# Patient Record
Sex: FEMALE | Race: WHITE | NOT HISPANIC OR LATINO | URBAN - METROPOLITAN AREA
[De-identification: names, ages, dates, MRNs, and addresses within clinical notes are randomized per-mention and may not be internally consistent; named-entity substitution may affect disease eponyms.]

---

## 2018-03-20 ENCOUNTER — EMERGENCY (EMERGENCY)
Facility: HOSPITAL | Age: 78
LOS: 0 days | Discharge: HOME | End: 2018-03-20
Attending: EMERGENCY MEDICINE

## 2018-03-20 VITALS
RESPIRATION RATE: 18 BRPM | OXYGEN SATURATION: 97 % | SYSTOLIC BLOOD PRESSURE: 114 MMHG | HEART RATE: 79 BPM | DIASTOLIC BLOOD PRESSURE: 76 MMHG

## 2018-03-20 VITALS
HEART RATE: 84 BPM | DIASTOLIC BLOOD PRESSURE: 97 MMHG | SYSTOLIC BLOOD PRESSURE: 158 MMHG | OXYGEN SATURATION: 100 % | RESPIRATION RATE: 18 BRPM | TEMPERATURE: 98 F

## 2018-03-20 DIAGNOSIS — J45.909 UNSPECIFIED ASTHMA, UNCOMPLICATED: ICD-10-CM

## 2018-03-20 DIAGNOSIS — I48.91 UNSPECIFIED ATRIAL FIBRILLATION: ICD-10-CM

## 2018-03-20 DIAGNOSIS — R60.0 LOCALIZED EDEMA: ICD-10-CM

## 2018-03-20 DIAGNOSIS — R06.02 SHORTNESS OF BREATH: ICD-10-CM

## 2018-03-20 DIAGNOSIS — R00.2 PALPITATIONS: ICD-10-CM

## 2018-03-20 DIAGNOSIS — E10.9 TYPE 1 DIABETES MELLITUS WITHOUT COMPLICATIONS: ICD-10-CM

## 2018-03-20 LAB
ALBUMIN SERPL ELPH-MCNC: 4.4 G/DL — SIGNIFICANT CHANGE UP (ref 3.5–5.2)
ALP SERPL-CCNC: 89 U/L — SIGNIFICANT CHANGE UP (ref 30–115)
ALT FLD-CCNC: 21 U/L — SIGNIFICANT CHANGE UP (ref 0–41)
ANION GAP SERPL CALC-SCNC: 15 MMOL/L — HIGH (ref 7–14)
APTT BLD: 35.8 SEC — SIGNIFICANT CHANGE UP (ref 27–39.2)
AST SERPL-CCNC: 25 U/L — SIGNIFICANT CHANGE UP (ref 0–41)
BASOPHILS # BLD AUTO: 0.02 K/UL — SIGNIFICANT CHANGE UP (ref 0–0.2)
BASOPHILS NFR BLD AUTO: 0.3 % — SIGNIFICANT CHANGE UP (ref 0–1)
BILIRUB SERPL-MCNC: 0.4 MG/DL — SIGNIFICANT CHANGE UP (ref 0.2–1.2)
BUN SERPL-MCNC: 12 MG/DL — SIGNIFICANT CHANGE UP (ref 10–20)
CALCIUM SERPL-MCNC: 9.2 MG/DL — SIGNIFICANT CHANGE UP (ref 8.5–10.1)
CHLORIDE SERPL-SCNC: 97 MMOL/L — LOW (ref 98–110)
CK MB BLD-MCNC: SIGNIFICANT CHANGE UP % (ref 0–4)
CK MB CFR SERPL CALC: 3.4 NG/ML — SIGNIFICANT CHANGE UP (ref 0.6–6.3)
CK SERPL-CCNC: 140 U/L — SIGNIFICANT CHANGE UP (ref 0–225)
CO2 SERPL-SCNC: 27 MMOL/L — SIGNIFICANT CHANGE UP (ref 17–32)
CREAT SERPL-MCNC: 0.7 MG/DL — SIGNIFICANT CHANGE UP (ref 0.7–1.5)
D DIMER BLD IA.RAPID-MCNC: 340 NG/ML DDU — HIGH (ref 0–230)
EOSINOPHIL # BLD AUTO: 0.06 K/UL — SIGNIFICANT CHANGE UP (ref 0–0.7)
EOSINOPHIL NFR BLD AUTO: 0.8 % — SIGNIFICANT CHANGE UP (ref 0–8)
GLUCOSE SERPL-MCNC: 86 MG/DL — SIGNIFICANT CHANGE UP (ref 70–110)
HCT VFR BLD CALC: 36.6 % — LOW (ref 37–47)
HGB BLD-MCNC: 12.5 G/DL — SIGNIFICANT CHANGE UP (ref 12–16)
IMM GRANULOCYTES NFR BLD AUTO: 0.1 % — SIGNIFICANT CHANGE UP (ref 0.1–0.3)
INR BLD: 1.04 RATIO — SIGNIFICANT CHANGE UP (ref 0.65–1.3)
LYMPHOCYTES # BLD AUTO: 2.08 K/UL — SIGNIFICANT CHANGE UP (ref 1.2–3.4)
LYMPHOCYTES # BLD AUTO: 29.1 % — SIGNIFICANT CHANGE UP (ref 20.5–51.1)
MCHC RBC-ENTMCNC: 32.1 PG — HIGH (ref 27–31)
MCHC RBC-ENTMCNC: 34.2 G/DL — SIGNIFICANT CHANGE UP (ref 32–37)
MCV RBC AUTO: 94.1 FL — SIGNIFICANT CHANGE UP (ref 81–99)
MONOCYTES # BLD AUTO: 0.6 K/UL — SIGNIFICANT CHANGE UP (ref 0.1–0.6)
MONOCYTES NFR BLD AUTO: 8.4 % — SIGNIFICANT CHANGE UP (ref 1.7–9.3)
NEUTROPHILS # BLD AUTO: 4.37 K/UL — SIGNIFICANT CHANGE UP (ref 1.4–6.5)
NEUTROPHILS NFR BLD AUTO: 61.3 % — SIGNIFICANT CHANGE UP (ref 42.2–75.2)
NRBC # BLD: 0 /100 WBCS — SIGNIFICANT CHANGE UP (ref 0–0)
NT-PROBNP SERPL-SCNC: 1235 PG/ML — HIGH (ref 0–300)
PLATELET # BLD AUTO: 176 K/UL — SIGNIFICANT CHANGE UP (ref 130–400)
POTASSIUM SERPL-MCNC: 3.8 MMOL/L — SIGNIFICANT CHANGE UP (ref 3.5–5)
POTASSIUM SERPL-SCNC: 3.8 MMOL/L — SIGNIFICANT CHANGE UP (ref 3.5–5)
PROT SERPL-MCNC: 7.2 G/DL — SIGNIFICANT CHANGE UP (ref 6–8)
PROTHROM AB SERPL-ACNC: 11.3 SEC — SIGNIFICANT CHANGE UP (ref 9.95–12.87)
RBC # BLD: 3.89 M/UL — LOW (ref 4.2–5.4)
RBC # FLD: 13.2 % — SIGNIFICANT CHANGE UP (ref 11.5–14.5)
SODIUM SERPL-SCNC: 139 MMOL/L — SIGNIFICANT CHANGE UP (ref 135–146)
TROPONIN T SERPL-MCNC: <0.01 NG/ML — SIGNIFICANT CHANGE UP
WBC # BLD: 7.14 K/UL — SIGNIFICANT CHANGE UP (ref 4.8–10.8)
WBC # FLD AUTO: 7.14 K/UL — SIGNIFICANT CHANGE UP (ref 4.8–10.8)

## 2018-03-20 RX ORDER — SODIUM CHLORIDE 9 MG/ML
3 INJECTION INTRAMUSCULAR; INTRAVENOUS; SUBCUTANEOUS ONCE
Qty: 0 | Refills: 0 | Status: COMPLETED | OUTPATIENT
Start: 2018-03-20 | End: 2018-03-20

## 2018-03-20 RX ORDER — APIXABAN 2.5 MG/1
1 TABLET, FILM COATED ORAL
Qty: 42 | Refills: 0 | OUTPATIENT
Start: 2018-03-20 | End: 2018-04-09

## 2018-03-20 RX ORDER — APIXABAN 2.5 MG/1
2 TABLET, FILM COATED ORAL
Qty: 28 | Refills: 0
Start: 2018-03-20 | End: 2018-03-26

## 2018-03-20 RX ADMIN — SODIUM CHLORIDE 3 MILLILITER(S): 9 INJECTION INTRAMUSCULAR; INTRAVENOUS; SUBCUTANEOUS at 16:35

## 2018-03-20 NOTE — ED PROVIDER NOTE - PROGRESS NOTE DETAILS
pt is signing out ama.  pt refuses to stay for admission.  The patient wishes to leave against medical advice.  I have discussed the risks, benefits and alternatives (including the possibility of worsening of disease, pain, permanent disability, and/or death) with the patient and his/her family (if available).  The patient voices understanding of these risks, benefits, and alternatives and still wishes to sign out against medical advice.  The patient is awake, alert, oriented  x 3 and has demonstrated capacity to refuse/direct care.  I have advised the patient that they can and should return immediately should they develop any worse/different/additional symptoms, or if they change their mind and want to continue their care.   Will start pt on anticoagulation.  pt understands importance of making sure she gets the medications patient refuses admission secondary to having to take care of  at home. Patient given all diagnostic studies and will rx eliquis for a.fib. patient to follow up cardiology outpatient. pt signed out ama.  I spoke to pt at length and so did BARON Orellana. pt adamently refused admission.  pt started on eliquis.  pt aware of rx instructions and to follow up with a pmd and cardiolgoist.  pt also aware of new onset afib and about the chf.  pt understood and accepted asll risks.  pt adamently refused admission and insisted on leaving the ED.  pt instructed to follow upw ith cardiologist and informed of importance of cardiology follow up.

## 2018-03-20 NOTE — ED PROVIDER NOTE - OBJECTIVE STATEMENT
79 y/o female c/o palpitations and lower extremity edema. patient c/o increasing exertional dyspnea. patient denies any fever,chills, abdominal pain. patient denies any chest pain or syncope. patient states she drinks alcohol daily x 1 glass because she has stressor of taking care of her

## 2018-03-20 NOTE — ED PROVIDER NOTE - ATTENDING CONTRIBUTION TO CARE
77 yo f with pmh of asthma sent in for new onset afib and lower ext swelling.  Pt also with occasionl KENNEDY.  no fevers, no chills, no nausea, no vomiting, no chest pain, no back pain, no headache, no neck pain, no neck stiffness, no dizziness.  awake, alert.  neck supple.  Lungs clear.  3+ LE edema bilaterally.  abd soft, nontender.  MMM.    Pt states no blood in stool, no black stool.  a/p:  new onset afib, bilateral LE edema.  p:  labs, ekg, cxr, dvt study, reassess.  I spoke to [employer] who states DVT study is negative.

## 2018-03-20 NOTE — ED PROVIDER NOTE - NS ED ROS FT
Review of Systems    Constitutional: (-) fever/ chills (-) weight loss  Eyes/ENT: (-) blurry vision, (-) epistaxis (-) sore throat (-) ear pain  Cardiovascular: (-) chest pain, (-) syncope   Respiratory: (-) cough, (+) exertional shortness of breath  Gastrointestinal: (-) vomiting, (-) diarrhea (-) abdominal pain  Musculoskeletal: (-) neck pain, (-) back pain, (-) joint pain (+) pedal edema   Integumentary: (-) rash, (-) swelling  Neurological: (-) headache, (-) altered mental status  Psychiatric: (-) hallucinations or depression   Allergic/Immunologic: (-) pruritus

## 2018-03-20 NOTE — ED PROVIDER NOTE - PHYSICAL EXAMINATION
Vital Signs: I have reviewed the initial vital signs.  Constitutional: well-nourished, no acute distress, normocephalic  Eyes: PERRLA, EOMI, no nystagmus, clear conjunctiva  ENT: MMM, TM b/l clear , no nasal congestion  Cardiovascular: regular rate, irregular rhythm, no murmur appreciated  Respiratory: unlabored respiratory effort, clear to auscultation bilaterally  Gastrointestinal: soft, non-tender, non-distended  abdomen, no pulsatile mass  Musculoskeletal: supple neck ,+b/l pitting lower extremity edema, no bony tenderness  Integumentary: warm, dry, no rash  Neurologic: awake, alert, cranial nerves II-XII grossly intact, extremities’ motor and sensory functions grossly intact, no focal deficits, GCS 15  Psychiatric: appropriate mood, appropriate affect

## 2018-10-25 PROBLEM — Z00.00 ENCOUNTER FOR PREVENTIVE HEALTH EXAMINATION: Status: ACTIVE | Noted: 2018-10-25

## 2019-03-14 PROBLEM — J40 BRONCHITIS, NOT SPECIFIED AS ACUTE OR CHRONIC: Chronic | Status: ACTIVE | Noted: 2018-03-20

## 2019-03-14 PROBLEM — J45.909 UNSPECIFIED ASTHMA, UNCOMPLICATED: Chronic | Status: ACTIVE | Noted: 2018-03-20

## 2019-04-02 NOTE — ASU PATIENT PROFILE, ADULT - PMH
Asthma    Atrial fibrillation    Bronchitis    Hypoglycemic unawareness associated with type 1 diabetes mellitus

## 2019-04-03 ENCOUNTER — RESULT REVIEW (OUTPATIENT)
Age: 79
End: 2019-04-03

## 2019-04-03 ENCOUNTER — OUTPATIENT (OUTPATIENT)
Dept: OUTPATIENT SERVICES | Facility: HOSPITAL | Age: 79
LOS: 1 days | Discharge: HOME | End: 2019-04-03
Payer: COMMERCIAL

## 2019-04-03 VITALS — RESPIRATION RATE: 17 BRPM | DIASTOLIC BLOOD PRESSURE: 74 MMHG | SYSTOLIC BLOOD PRESSURE: 118 MMHG | HEART RATE: 68 BPM

## 2019-04-03 VITALS
HEART RATE: 84 BPM | HEIGHT: 65 IN | WEIGHT: 125 LBS | SYSTOLIC BLOOD PRESSURE: 156 MMHG | RESPIRATION RATE: 18 BRPM | TEMPERATURE: 97 F | OXYGEN SATURATION: 97 % | DIASTOLIC BLOOD PRESSURE: 67 MMHG

## 2019-04-03 PROCEDURE — 88300 SURGICAL PATH GROSS: CPT | Mod: 26,59

## 2019-04-03 NOTE — PRE-ANESTHESIA EVALUATION ADULT - NSANTHOSAYNRD_GEN_A_CORE
No. CURTIS screening performed.  STOP BANG Legend: 0-2 = LOW Risk; 3-4 = INTERMEDIATE Risk; 5-8 = HIGH Risk

## 2019-04-04 LAB — SURGICAL PATHOLOGY STUDY: SIGNIFICANT CHANGE UP

## 2019-04-12 DIAGNOSIS — Z79.01 LONG TERM (CURRENT) USE OF ANTICOAGULANTS: ICD-10-CM

## 2019-04-12 DIAGNOSIS — H25.11 AGE-RELATED NUCLEAR CATARACT, RIGHT EYE: ICD-10-CM

## 2019-04-12 DIAGNOSIS — I48.91 UNSPECIFIED ATRIAL FIBRILLATION: ICD-10-CM

## 2019-05-24 ENCOUNTER — TRANSCRIPTION ENCOUNTER (OUTPATIENT)
Age: 79
End: 2019-05-24

## 2020-10-01 ENCOUNTER — TRANSCRIPTION ENCOUNTER (OUTPATIENT)
Age: 80
End: 2020-10-01

## 2021-03-24 ENCOUNTER — INPATIENT (INPATIENT)
Facility: HOSPITAL | Age: 81
LOS: 2 days | Discharge: HOME HEALTH | End: 2021-03-27
Attending: INTERNAL MEDICINE | Admitting: INTERNAL MEDICINE
Payer: COMMERCIAL

## 2021-03-24 VITALS
DIASTOLIC BLOOD PRESSURE: 64 MMHG | OXYGEN SATURATION: 97 % | HEIGHT: 65 IN | WEIGHT: 119.93 LBS | HEART RATE: 78 BPM | SYSTOLIC BLOOD PRESSURE: 121 MMHG | TEMPERATURE: 97 F | RESPIRATION RATE: 19 BRPM

## 2021-03-24 PROBLEM — I48.91 UNSPECIFIED ATRIAL FIBRILLATION: Chronic | Status: ACTIVE | Noted: 2019-04-03

## 2021-03-24 LAB
ALBUMIN SERPL ELPH-MCNC: 3.4 G/DL — LOW (ref 3.5–5.2)
ALP SERPL-CCNC: 58 U/L — SIGNIFICANT CHANGE UP (ref 30–115)
ALT FLD-CCNC: 19 U/L — SIGNIFICANT CHANGE UP (ref 0–41)
ANION GAP SERPL CALC-SCNC: 12 MMOL/L — SIGNIFICANT CHANGE UP (ref 7–14)
AST SERPL-CCNC: 32 U/L — SIGNIFICANT CHANGE UP (ref 0–41)
BASOPHILS # BLD AUTO: 0.01 K/UL — SIGNIFICANT CHANGE UP (ref 0–0.2)
BASOPHILS NFR BLD AUTO: 0.1 % — SIGNIFICANT CHANGE UP (ref 0–1)
BILIRUB DIRECT SERPL-MCNC: 0.2 MG/DL — SIGNIFICANT CHANGE UP (ref 0–0.2)
BILIRUB INDIRECT FLD-MCNC: 0.4 MG/DL — SIGNIFICANT CHANGE UP (ref 0.2–1.2)
BILIRUB SERPL-MCNC: 0.6 MG/DL — SIGNIFICANT CHANGE UP (ref 0.2–1.2)
BUN SERPL-MCNC: 18 MG/DL — SIGNIFICANT CHANGE UP (ref 10–20)
CALCIUM SERPL-MCNC: 8.3 MG/DL — LOW (ref 8.5–10.1)
CHLORIDE SERPL-SCNC: 99 MMOL/L — SIGNIFICANT CHANGE UP (ref 98–110)
CO2 SERPL-SCNC: 25 MMOL/L — SIGNIFICANT CHANGE UP (ref 17–32)
CREAT SERPL-MCNC: 0.8 MG/DL — SIGNIFICANT CHANGE UP (ref 0.7–1.5)
EOSINOPHIL # BLD AUTO: 0 K/UL — SIGNIFICANT CHANGE UP (ref 0–0.7)
EOSINOPHIL NFR BLD AUTO: 0 % — SIGNIFICANT CHANGE UP (ref 0–8)
GLUCOSE SERPL-MCNC: 111 MG/DL — HIGH (ref 70–99)
HCT VFR BLD CALC: 37.6 % — SIGNIFICANT CHANGE UP (ref 37–47)
HGB BLD-MCNC: 12.9 G/DL — SIGNIFICANT CHANGE UP (ref 12–16)
IMM GRANULOCYTES NFR BLD AUTO: 1 % — HIGH (ref 0.1–0.3)
LACTATE SERPL-SCNC: 1.4 MMOL/L — SIGNIFICANT CHANGE UP (ref 0.7–2)
LIDOCAIN IGE QN: 32 U/L — SIGNIFICANT CHANGE UP (ref 7–60)
LYMPHOCYTES # BLD AUTO: 1.28 K/UL — SIGNIFICANT CHANGE UP (ref 1.2–3.4)
LYMPHOCYTES # BLD AUTO: 18.7 % — LOW (ref 20.5–51.1)
MCHC RBC-ENTMCNC: 31.3 PG — HIGH (ref 27–31)
MCHC RBC-ENTMCNC: 34.3 G/DL — SIGNIFICANT CHANGE UP (ref 32–37)
MCV RBC AUTO: 91.3 FL — SIGNIFICANT CHANGE UP (ref 81–99)
MONOCYTES # BLD AUTO: 0.37 K/UL — SIGNIFICANT CHANGE UP (ref 0.1–0.6)
MONOCYTES NFR BLD AUTO: 5.4 % — SIGNIFICANT CHANGE UP (ref 1.7–9.3)
NEUTROPHILS # BLD AUTO: 5.12 K/UL — SIGNIFICANT CHANGE UP (ref 1.4–6.5)
NEUTROPHILS NFR BLD AUTO: 74.8 % — SIGNIFICANT CHANGE UP (ref 42.2–75.2)
NRBC # BLD: 0 /100 WBCS — SIGNIFICANT CHANGE UP (ref 0–0)
NT-PROBNP SERPL-SCNC: 2129 PG/ML — HIGH (ref 0–300)
PLATELET # BLD AUTO: 166 K/UL — SIGNIFICANT CHANGE UP (ref 130–400)
POTASSIUM SERPL-MCNC: 3.7 MMOL/L — SIGNIFICANT CHANGE UP (ref 3.5–5)
POTASSIUM SERPL-SCNC: 3.7 MMOL/L — SIGNIFICANT CHANGE UP (ref 3.5–5)
PROT SERPL-MCNC: 6.9 G/DL — SIGNIFICANT CHANGE UP (ref 6–8)
RBC # BLD: 4.12 M/UL — LOW (ref 4.2–5.4)
RBC # FLD: 12.7 % — SIGNIFICANT CHANGE UP (ref 11.5–14.5)
SARS-COV-2 RNA SPEC QL NAA+PROBE: DETECTED
SODIUM SERPL-SCNC: 136 MMOL/L — SIGNIFICANT CHANGE UP (ref 135–146)
TROPONIN T SERPL-MCNC: <0.01 NG/ML — SIGNIFICANT CHANGE UP
WBC # BLD: 6.85 K/UL — SIGNIFICANT CHANGE UP (ref 4.8–10.8)
WBC # FLD AUTO: 6.85 K/UL — SIGNIFICANT CHANGE UP (ref 4.8–10.8)

## 2021-03-24 PROCEDURE — 99285 EMERGENCY DEPT VISIT HI MDM: CPT

## 2021-03-24 PROCEDURE — 93010 ELECTROCARDIOGRAM REPORT: CPT

## 2021-03-24 PROCEDURE — 74177 CT ABD & PELVIS W/CONTRAST: CPT | Mod: 26

## 2021-03-24 PROCEDURE — 71045 X-RAY EXAM CHEST 1 VIEW: CPT | Mod: 26

## 2021-03-24 PROCEDURE — 99223 1ST HOSP IP/OBS HIGH 75: CPT

## 2021-03-24 RX ORDER — IPRATROPIUM/ALBUTEROL SULFATE 18-103MCG
3 AEROSOL WITH ADAPTER (GRAM) INHALATION EVERY 6 HOURS
Refills: 0 | Status: DISCONTINUED | OUTPATIENT
Start: 2021-03-24 | End: 2021-03-25

## 2021-03-24 RX ORDER — DEXAMETHASONE 0.5 MG/5ML
6 ELIXIR ORAL DAILY
Refills: 0 | Status: DISCONTINUED | OUTPATIENT
Start: 2021-03-24 | End: 2021-03-24

## 2021-03-24 RX ORDER — ALBUTEROL 90 UG/1
1 AEROSOL, METERED ORAL EVERY 4 HOURS
Refills: 0 | Status: DISCONTINUED | OUTPATIENT
Start: 2021-03-24 | End: 2021-03-25

## 2021-03-24 RX ORDER — CHLORHEXIDINE GLUCONATE 213 G/1000ML
1 SOLUTION TOPICAL
Refills: 0 | Status: DISCONTINUED | OUTPATIENT
Start: 2021-03-24 | End: 2021-03-27

## 2021-03-24 RX ORDER — METOPROLOL TARTRATE 50 MG
50 TABLET ORAL DAILY
Refills: 0 | Status: DISCONTINUED | OUTPATIENT
Start: 2021-03-24 | End: 2021-03-27

## 2021-03-24 RX ORDER — SODIUM CHLORIDE 9 MG/ML
1000 INJECTION INTRAMUSCULAR; INTRAVENOUS; SUBCUTANEOUS ONCE
Refills: 0 | Status: COMPLETED | OUTPATIENT
Start: 2021-03-24 | End: 2021-03-24

## 2021-03-24 RX ORDER — TIOTROPIUM BROMIDE 18 UG/1
1 CAPSULE ORAL; RESPIRATORY (INHALATION) DAILY
Refills: 0 | Status: DISCONTINUED | OUTPATIENT
Start: 2021-03-24 | End: 2021-03-27

## 2021-03-24 RX ORDER — CHOLECALCIFEROL (VITAMIN D3) 125 MCG
1 CAPSULE ORAL
Qty: 0 | Refills: 0 | DISCHARGE

## 2021-03-24 RX ORDER — ONDANSETRON 8 MG/1
4 TABLET, FILM COATED ORAL ONCE
Refills: 0 | Status: COMPLETED | OUTPATIENT
Start: 2021-03-24 | End: 2021-03-24

## 2021-03-24 RX ORDER — ALBUTEROL 90 UG/1
2 AEROSOL, METERED ORAL EVERY 6 HOURS
Refills: 0 | Status: DISCONTINUED | OUTPATIENT
Start: 2021-03-24 | End: 2021-03-25

## 2021-03-24 RX ORDER — FUROSEMIDE 40 MG
20 TABLET ORAL DAILY
Refills: 0 | Status: DISCONTINUED | OUTPATIENT
Start: 2021-03-24 | End: 2021-03-24

## 2021-03-24 RX ORDER — APIXABAN 2.5 MG/1
5 TABLET, FILM COATED ORAL EVERY 12 HOURS
Refills: 0 | Status: DISCONTINUED | OUTPATIENT
Start: 2021-03-24 | End: 2021-03-27

## 2021-03-24 RX ORDER — GABAPENTIN 400 MG/1
100 CAPSULE ORAL THREE TIMES A DAY
Refills: 0 | Status: DISCONTINUED | OUTPATIENT
Start: 2021-03-24 | End: 2021-03-27

## 2021-03-24 RX ORDER — FAMOTIDINE 10 MG/ML
20 INJECTION INTRAVENOUS ONCE
Refills: 0 | Status: COMPLETED | OUTPATIENT
Start: 2021-03-24 | End: 2021-03-24

## 2021-03-24 RX ORDER — FUROSEMIDE 40 MG
1 TABLET ORAL
Qty: 0 | Refills: 0 | DISCHARGE

## 2021-03-24 RX ORDER — SODIUM CHLORIDE 9 MG/ML
500 INJECTION, SOLUTION INTRAVENOUS
Refills: 0 | Status: DISCONTINUED | OUTPATIENT
Start: 2021-03-24 | End: 2021-03-25

## 2021-03-24 RX ORDER — METOPROLOL TARTRATE 50 MG
1 TABLET ORAL
Qty: 0 | Refills: 0 | DISCHARGE

## 2021-03-24 RX ADMIN — Medication 3 MILLILITER(S): at 23:29

## 2021-03-24 RX ADMIN — ONDANSETRON 4 MILLIGRAM(S): 8 TABLET, FILM COATED ORAL at 09:33

## 2021-03-24 RX ADMIN — FAMOTIDINE 20 MILLIGRAM(S): 10 INJECTION INTRAVENOUS at 09:33

## 2021-03-24 RX ADMIN — SODIUM CHLORIDE 100 MILLILITER(S): 9 INJECTION, SOLUTION INTRAVENOUS at 14:26

## 2021-03-24 RX ADMIN — SODIUM CHLORIDE 1000 MILLILITER(S): 9 INJECTION INTRAMUSCULAR; INTRAVENOUS; SUBCUTANEOUS at 09:33

## 2021-03-24 RX ADMIN — APIXABAN 5 MILLIGRAM(S): 2.5 TABLET, FILM COATED ORAL at 17:17

## 2021-03-24 NOTE — ED PROVIDER NOTE - ATTENDING CONTRIBUTION TO CARE
82yo F with PMHx Afib/Eliquis, HTN, Asthma, presents for feeling generally unwell for 2 weeks, c/o generalized weakness, loss of taste/smell, poor appetite, nausea, diarrhea, abdominal pain. Feels too weak to continue caring for herself at home. Denies fever, chills, headache, lightheadedness, CP, SOB, cough, nausea, vomiting, diarrhea, abd pain, dysuria, hematuria, leg swelling.     Vital signs reviewed  GENERAL: Patient nontoxic appearing, NAD  HEAD: NCAT  EYES: Anicteric  ENT: Slightly dry MM   RESPIRATORY: Normal respiratory effort. CTA B/L. No wheezing, rales, rhonchi  CARDIOVASCULAR: Irregular  ABDOMEN: Soft. Nondistended. Mild epigastric TTP. No guarding or rebound.   MUSCULOSKELETAL/EXTREMITIES: Brisk cap refill. 2+ radial pulses. No leg edema  SKIN:  Warm and dry  NEURO: AAOx3. No gross FND

## 2021-03-24 NOTE — H&P ADULT - HISTORY OF PRESENT ILLNESS
80 y/o female with hx Atrial fibrillation on eliquis, HTN presents to the ED c/o "I don't feel well for the last 2 weeks. I feel weak, no appetite, nausea, diarrhea, cough, and SOB."     no CP/ vomit/ leg pain/ leg swelling    Vital Signs Last 24 Hrs  T(C): 36.3 (24 Mar 2021 08:47), Max: 36.3 (24 Mar 2021 08:47)  T(F): 97.3 (24 Mar 2021 08:47), Max: 97.3 (24 Mar 2021 08:47)  HR: 78 (24 Mar 2021 08:47) (78 - 78)  BP: 121/64 (24 Mar 2021 08:47) (121/64 - 121/64)  BP(mean): --  RR: 19 (24 Mar 2021 08:47) (19 - 19)  SpO2: 97% (24 Mar 2021 08:47) (97% - 97%)    In the ED No CT evidence of an acute abdominopelvic pathology.  Patchy bibasilar peripheral groundglass opacities compatible with known Covid-19 infection. Right lower lobe 1.4 cm subpleural nodule versus focal atelectasis. Once acute condition resolves, further evaluation with an outpatient PET/CT is suggested.  Colonic diverticulosis without evidence for acute diverticulitis.       80 y/o female with PMhx Atrial fibrillation on eliquis, HTN, Asthma and Bronchitis presents to the ED c/o "I don't feel well for the last 2 weeks. I feel weak, no appetite, nausea, diarrhea, cough, and SOB." Patient states that she last left her home 2 weeks ago to go the bank and has felt since. Noted loss of taste and smell, which led to her not wanting to eat. More recently patient's nausea and vomiting has become more pronounced. Patient came to the ED because she felt too weak to stay at home.     Denies headache, SOB, CP, changes in urination, leg pain or swelling.     Vital Signs Last 24 Hrs  T(C): 36.3 (24 Mar 2021 08:47), Max: 36.3 (24 Mar 2021 08:47)  T(F): 97.3 (24 Mar 2021 08:47), Max: 97.3 (24 Mar 2021 08:47)  HR: 78 (24 Mar 2021 08:47) (78 - 78)  BP: 121/64 (24 Mar 2021 08:47) (121/64 - 121/64)  BP(mean): --  RR: 19 (24 Mar 2021 08:47) (19 - 19)  SpO2: 97% (24 Mar 2021 08:47) (97% - 97%)    In the ED No CT evidence of an acute abdominopelvic pathology.  Patchy bibasilar peripheral groundglass opacities compatible with known Covid-19 infection. Right lower lobe 1.4 cm subpleural nodule versus focal atelectasis. Once acute condition resolves, further evaluation with an outpatient PET/CT is suggested.  Colonic diverticulosis without evidence for acute diverticulitis.

## 2021-03-24 NOTE — ED ADULT NURSE NOTE - BREATHING, MLM
Spontaneous, unlabored and symmetrical Increase medication the therapeutic dose You came to the ED because you were having difficulty speaking and swallowing, which have since improved. You reported that you reduced your home dose of Keppra in the past few weeks, which is the likely cause of your symptoms. CT and MR head were negative. EEG inpatient was negative. You came to the ED because you were having difficulty speaking and swallowing, which have since improved. You reported that you reduced your home dose of Keppra in the past few weeks, which is the likely cause of your symptoms. CT and MR head were negative. EEG inpatient was negative. You are advised to continue Keppra 1000mg twice per day. Follow up with a neurologist in 1 week. Supplementation You were found to have a deficiency of folic acid. We have provided you with a prescription to take folic acid supplement outpatient daily. Follow up with a primary doctor in 2 weeks.

## 2021-03-24 NOTE — H&P ADULT - ATTENDING COMMENTS
HPI:  80 y/o woman with PMhx Atrial fibrillation on eliquis, HTN, Asthma and Bronchitis admitted for failure to thrive secondary to COVID. She reports going to the bank 2 weeks ago and then noticed she began increasingly fatigued, poor appetite, occasional nausea and vomiting, as well as a loss of taste and smell. She reports come cough and SOB with exertion. She felt very weak and had been unable to care for herself or her cats. Otherwise her review of systems was negative.   She was COVID positive in the ED, on room air.     REVIEW OF SYSTEMS:  CONSTITUTIONAL:  + weakness/fatigue, no fevers, chills, night sweats, weight loss  EYES/ENT: No visual changes. No vertigo or dysphagia  NECK: No neck pain or stiffness  RESPIRATORY: + cough, no wheezing, hemoptysis. + shortness of breath  CARDIOVASCULAR: No chest pain or palpitations. No lower extremity edema  GASTROINTESTINAL: No abdominal pain. + nausea, vomiting. No diarrhea, or hematemesis. +poor po intakes   GENITOURINARY: No dysuria or hematuria   NEUROLOGICAL: No focal numbness or weakness  SKIN: No rashes or itching  HEMATOLOGIC: No easy bruising or prolonged bleeding.      PHYSICAL EXAM:  GENERAL: NAD, well-developed, Non-toxic, elderly but stated age   HEAD:  Atraumatic, Normocephalic  EYES: EOMI, Sclera White   NECK: Supple, No JVD  CHEST/LUNG: Clear to auscultation bilaterally but reduced air entry globally; No wheezing, rhonchi, or crackles  HEART: Regular rate and irregular rhythm; s1, s2, No murmurs, rubs, or gallops  ABDOMEN: Soft, Nontender, Nondistended; Bowel sounds present, No rebound or guarding noted   EXTREMITIES:  No lower extremity edema.  b/l chronic calf tenderness to palpation.  No clubbing or cyanosis  PSYCH: AAOx3, pleasant, cooperative, not anxious  NEUROLOGY: non-focal  SKIN: No rashes or lesions      ASSESSMENT AND PLAN:  Failure to thrive secondary to COVID-19 Pneumonia: SIRS not present on admission. Follow up inflammatory markers (Ferritin, LDH, CRP, ESR, D-Dimer) and procalcitonin. Supplemental O2 as needed. Check type and screen and COVID antibodies for possible plasma.   She isnt hypoxic, no need for Dexamethasone. Out of the window for Remdesivir. Consider Toci if clinically deteriorating.   -Cont IVF until oral intake is consistent. Hold lasix for now.   -Encourage oral intake, improving, ate some leesa's at bedside.     Chronic Atrial Fibrillation: rate controlled, on metoprolol. Cont eliquis     HTN: cont home medications, lasix held for now until PO intake improved and off IVF     Chronic Bronchitis/Asthma: Cont albuterol PRN     Neuropathic leg pain: Can start a trial of gabapentin 100mg TID    DVT ppx: on eliquis   GI ppx: Not indicated    My note supersedes the residents in the event of a discrepancy.

## 2021-03-24 NOTE — ED ADULT TRIAGE NOTE - BP NONINVASIVE DIASTOLIC (MM HG)
Overdue order for mammogram that was put into epic under Delores Bush FNP GNP 6/4/18.  Order extended 30 days and letter mailed.   63

## 2021-03-24 NOTE — H&P ADULT - ASSESSMENT
82 y/o female with hx Atrial fibrillation on eliquis, HTN presents to the ED c/o "I don't feel well for the last 2 weeks. I feel weak, no appetite, nausea, diarrhea, cough, and SOB."     # Weakness/ Abdominal discomfort secondary to COVID 19  - No CT evidence of an acute abdominopelvic pathology.    - Patchy bibasilar peripheral groundglass opacities compatible with known Covid-19 infection  - Right lower lobe 1.4 cm subpleural nodule versus focal atelectasis (Once acute condition resolves, further evaluation with an outpatient PET/CT is suggested)    - Colonic diverticulosis without evidence for acute diverticulitis  - Trend inflammatory markers (Ferritin, CRP, Procal)   - started decadron due to groundglass opacities, may taper if O2 demand remains low  - no need for Remdesivir at this time.   - ID consult placed    #Chronic Afib  - c/w eliquis and metoprolol    #MISC  DIET: DASH  DVT:  Eliquis  Dispo: Acute  CHG 82 y/o female with hx Atrial fibrillation on eliquis, HTN presents to the ED c/o "I don't feel well for the last 2 weeks. I feel weak, no appetite, nausea, diarrhea, cough, and SOB."     # Weakness/ Abdominal discomfort secondary to COVID 19  - No CT evidence of an acute abdominopelvic pathology  - Patchy bibasilar peripheral groundglass opacities compatible with known Covid-19 infection  - Right lower lobe 1.4 cm subpleural nodule versus focal atelectasis (Once acute condition resolves, further evaluation with an outpatient PET/CT is suggested)    - Colonic diverticulosis without evidence for acute diverticulitis  - Trend inflammatory markers (Ferritin, CRP, Procal, D-dimer)   - started decadron 6 mg daily due to groundglass opacities, may taper if O2 demand remains low  - no need for Remdesivir at this time.   - ID consult placed    #Chronic Afib  - c/w eliquis and metoprolol    #Asthma  - symbicort and duonebs as needed    #MISC  DIET: DASH  DVT:  Eliquis  Dispo: Acute  CHG 80 y/o female with hx Atrial fibrillation on eliquis, HTN presents to the ED c/o "I don't feel well for the last 2 weeks. I feel weak, no appetite, nausea, diarrhea, cough, and SOB."     # Weakness/ Abdominal discomfort secondary to COVID 19  - No CT evidence of an acute abdominopelvic pathology  - Patchy bibasilar peripheral groundglass opacities compatible with known Covid-19 infection  - Right lower lobe 1.4 cm subpleural nodule versus focal atelectasis (Once acute condition resolves, further evaluation with an outpatient PET/CT is suggested)    - Colonic diverticulosis without evidence for acute diverticulitis  - f/u chest xray  - Trend inflammatory markers (Ferritin, CRP, Procal, D-dimer)   - started decadron 6 mg daily due to groundglass opacities, may taper if O2 demand remains low  - no need for Remdesivir at this time.   - ID consult placed    #Chronic Afib  - c/w eliquis and metoprolol    #Asthma  - symbicort and duonebs as needed    #MISC  DIET: DASH  DVT:  Eliquis  Dispo: Acute  CHG 82 y/o female with hx Atrial fibrillation on eliquis, HTN presents to the ED c/o "I don't feel well for the last 2 weeks. I feel weak, no appetite, nausea, diarrhea, cough, and SOB."     # Weakness/ Abdominal discomfort secondary to COVID 19  - No CT evidence of an acute abdominopelvic pathology  - Patchy bibasilar peripheral groundglass opacities compatible with known Covid-19 infection  - Right lower lobe 1.4 cm subpleural nodule versus focal atelectasis (Once acute condition resolves, further evaluation with an outpatient PET/CT is suggested)    - Colonic diverticulosis without evidence for acute diverticulitis  - f/u chest xray  - Trend inflammatory markers (Ferritin, CRP, Procal, D-dimer)   - started decadron 6 mg daily due to groundglass opacities, may taper if O2 demand remains low  - no need for Remdesivir at this time.   - ID consult placed    #Chronic Afib  - c/w eliquis and metoprolol    #Asthma  - duonebs PRN    #MISC  DIET: DASH  DVT:  Eliquis  Dispo: Acute  CHG

## 2021-03-24 NOTE — ED PROVIDER NOTE - CLINICAL SUMMARY MEDICAL DECISION MAKING FREE TEXT BOX
80yo F presents with multiple complaints, found to be covid +ve. No hypoxia but feels too weak to care for self. Will admit.

## 2021-03-24 NOTE — ED ADULT NURSE NOTE - SUICIDE SCREENING QUESTION 1
No patient re-evaluated, c/o headache associated with b/l eye sensitivity to light x 5 days.  She notes younger son was sick with fever prior to her getting a fever.  She notes was seen in ED x 2 days ago and was c/o ST, underwent TC +corynebacterium.  She notes was placed on antibiotics, however still does not feel well.  She notes no prior history of headaches, has never seen a neurologist.  She notes feels jittery and today noticed rash on her chest.  She notes small tiny circular lesion on chest, denies itchiness.  She denies any trauma, falls, n/v/d or any recent travel.  Patient admits to smoking cigarettes 1PPD x 10 years, denies ETOH abuse or illicit drug use.  She does not regularly f/u with PCP.  Rectal temp revealed temp, IV tylenol ordered, patient vomitting in ED, labs ordered includding tick pannel, Utox, UA/UC and CT abd/pelvis and CT head ordered, pending results.  Consider spinal tap if symptoms persist. spinal tap done, pending results, care signed out to LEE gutiérrez LEE GONZALES: PT evaluated by intake physician. HPI/PE/ROS as noted above. Will follow up plan per intake physician   pending results of spinal tap.   pt supine for 1 hour s/p spinal tap spinal tap results normal  most likely viral syndrome.   plan: DC with pmd follow up, pain medication, and instructions for good hydration Becker: Pt with persistent HA and neck pain with flexion. LP results pending Rosita: significant leukocytes in CSF, concerning for viral meningitis. Will admit

## 2021-03-24 NOTE — H&P ADULT - NSHPPHYSICALEXAM_GEN_ALL_CORE
CONSTITUTIONAL: Well appearing, awake, alert, oriented to person, place, time/situation and in no apparent distress.  CARDIAC: irregular rhythm  RESPIRATORY: CTA bilaterally no MRG  GASTROINTESTINAL: Normoactive bowel sounds, soft  MUSCULOSKELETAL: FROM, no muscle or joint tenderness  NEUROLOGICAL: Alert and oriented, no focal deficits, no motor or sensory deficits.  SKIN: Skin normal color for race, warm, dry and intact. No evidence of rash.

## 2021-03-24 NOTE — ED PROVIDER NOTE - OBJECTIVE STATEMENT
80 y/o female with hx Atrial fibrillation on eliquis, HTN presents to the ED c/o "I don't feel well for the last 2 weeks. I feel weak, no appetite, nausea, diarrhea, cough, and SOB." no CP/ vomit/ leg pain/ leg swelling

## 2021-03-24 NOTE — H&P ADULT - NSHPLABSRESULTS_GEN_ALL_CORE
Serum Pro-Brain Natriuretic Peptide (03.24.21 @ 09:50)   Serum Pro-Brain Natriuretic Peptide: 2129 pg/mL Troponin T, Serum (03.24.21 @ 09:50)   Troponin T, Serum: <0.01 ng/mL   Lactate, Blood (03.24.21 @ 09:50)   Lactate, Blood: 1.4 mmol/L   Complete Blood Count + Automated Diff (03.24.21 @ 09:50)   WBC Count: 6.85 K/uL   RBC Count: 4.12 M/uL   Hemoglobin: 12.9 g/dL   Hematocrit: 37.6 %   Mean Cell Volume: 91.3 fL   Mean Cell Hemoglobin: 31.3 pg   Mean Cell Hemoglobin Conc: 34.3 g/dL   Red Cell Distrib Width: 12.7 %   Platelet Count - Automated: 166 K/uL   Auto Neutrophil #: 5.12 K/uL   Auto Lymphocyte #: 1.28 K/uL   Auto Monocyte #: 0.37 K/uL   Auto Eosinophil #: 0.00 K/uL   Auto Basophil #: 0.01 K/uL   Auto Neutrophil %: 74.8: Differential percentages must be correlated with absolute numbers for   clinical significance. %   Auto Lymphocyte %: 18.7 %   Auto Monocyte %: 5.4 %   Auto Eosinophil %: 0.0 %   Auto Basophil %: 0.1 %   Auto Immature Granulocyte %: 1.0 %   Nucleated RBC: 0 /100 WBCs Hepatic Function Panel (03.24.21 @ 09:50)   Indirect Reacting Bilirubin: 0.4 mg/dL   Protein Total, Serum: 6.9 g/dL   Albumin, Serum: 3.4 g/dL   Bilirubin Total, Serum: 0.6 mg/dL   Bilirubin Direct, Serum: 0.2: Hemolyzed. Interpret with caution mg/dL   Alkaline Phosphatase, Serum: 58 U/L   Aspartate Aminotransferase (AST/SGOT): 32: Hemolyzed. Interpret with caution U/L   Alanine Aminotransferase (ALT/SGPT): 19 U/LBasic Metabolic Panel (03.24.21 @ 09:50)   Sodium, Serum: 136 mmol/L   Potassium, Serum: 3.7: Slighty Hemolyzed use with Caution mmol/L   Chloride, Serum: 99 mmol/L   Carbon Dioxide, Serum: 25 mmol/L   Anion Gap, Serum: 12 mmol/L   Blood Urea Nitrogen, Serum: 18 mg/dL   Creatinine, Serum: 0.8 mg/dL   Glucose, Serum: 111 mg/dL   Calcium, Total Serum: 8.3 mg/dL   eGFR if Non : 69: Interpretative comment   The units for eGFR are mL/min/1.73M2 (COVID-19 PCR . (03.24.21 @ 09:27)   COVID-19 PCR: Detected: Lipase, Serum (03.24.21 @ 09:50)   Lipase, Serum: 32 U/L

## 2021-03-24 NOTE — ED PROVIDER NOTE - CARE PLAN
Principal Discharge DX:	Decreased oral intake  Secondary Diagnosis:	COVID-19  Secondary Diagnosis:	Weakness

## 2021-03-25 LAB
ALBUMIN SERPL ELPH-MCNC: 2.8 G/DL — LOW (ref 3.5–5.2)
ALP SERPL-CCNC: 46 U/L — SIGNIFICANT CHANGE UP (ref 30–115)
ALT FLD-CCNC: 14 U/L — SIGNIFICANT CHANGE UP (ref 0–41)
ANION GAP SERPL CALC-SCNC: 10 MMOL/L — SIGNIFICANT CHANGE UP (ref 7–14)
ANISOCYTOSIS BLD QL: SLIGHT — SIGNIFICANT CHANGE UP
AST SERPL-CCNC: 23 U/L — SIGNIFICANT CHANGE UP (ref 0–41)
BASOPHILS # BLD AUTO: 0 K/UL — SIGNIFICANT CHANGE UP (ref 0–0.2)
BASOPHILS NFR BLD AUTO: 0 % — SIGNIFICANT CHANGE UP (ref 0–1)
BILIRUB SERPL-MCNC: 0.4 MG/DL — SIGNIFICANT CHANGE UP (ref 0.2–1.2)
BUN SERPL-MCNC: 12 MG/DL — SIGNIFICANT CHANGE UP (ref 10–20)
CALCIUM SERPL-MCNC: 7.8 MG/DL — LOW (ref 8.5–10.1)
CHLORIDE SERPL-SCNC: 103 MMOL/L — SIGNIFICANT CHANGE UP (ref 98–110)
CO2 SERPL-SCNC: 24 MMOL/L — SIGNIFICANT CHANGE UP (ref 17–32)
CREAT SERPL-MCNC: 0.6 MG/DL — LOW (ref 0.7–1.5)
D DIMER BLD IA.RAPID-MCNC: 275 NG/ML DDU — HIGH (ref 0–230)
EOSINOPHIL # BLD AUTO: 0 K/UL — SIGNIFICANT CHANGE UP (ref 0–0.7)
EOSINOPHIL NFR BLD AUTO: 0 % — SIGNIFICANT CHANGE UP (ref 0–8)
GIANT PLATELETS BLD QL SMEAR: PRESENT — SIGNIFICANT CHANGE UP
GLUCOSE SERPL-MCNC: 95 MG/DL — SIGNIFICANT CHANGE UP (ref 70–99)
HCT VFR BLD CALC: 30.8 % — LOW (ref 37–47)
HGB BLD-MCNC: 10.6 G/DL — LOW (ref 12–16)
LYMPHOCYTES # BLD AUTO: 1.54 K/UL — SIGNIFICANT CHANGE UP (ref 1.2–3.4)
LYMPHOCYTES # BLD AUTO: 28.9 % — SIGNIFICANT CHANGE UP (ref 20.5–51.1)
MAGNESIUM SERPL-MCNC: 1.7 MG/DL — LOW (ref 1.8–2.4)
MANUAL SMEAR VERIFICATION: SIGNIFICANT CHANGE UP
MCHC RBC-ENTMCNC: 31.8 PG — HIGH (ref 27–31)
MCHC RBC-ENTMCNC: 34.4 G/DL — SIGNIFICANT CHANGE UP (ref 32–37)
MCV RBC AUTO: 92.5 FL — SIGNIFICANT CHANGE UP (ref 81–99)
MICROCYTES BLD QL: SLIGHT — SIGNIFICANT CHANGE UP
MONOCYTES # BLD AUTO: 0.65 K/UL — HIGH (ref 0.1–0.6)
MONOCYTES NFR BLD AUTO: 12.3 % — HIGH (ref 1.7–9.3)
NEUTROPHILS # BLD AUTO: 3.13 K/UL — SIGNIFICANT CHANGE UP (ref 1.4–6.5)
NEUTROPHILS NFR BLD AUTO: 57.9 % — SIGNIFICANT CHANGE UP (ref 42.2–75.2)
NEUTS BAND # BLD: 0.9 % — SIGNIFICANT CHANGE UP (ref 0–6)
PLAT MORPH BLD: ABNORMAL
PLATELET # BLD AUTO: 158 K/UL — SIGNIFICANT CHANGE UP (ref 130–400)
POTASSIUM SERPL-MCNC: 3.4 MMOL/L — LOW (ref 3.5–5)
POTASSIUM SERPL-SCNC: 3.4 MMOL/L — LOW (ref 3.5–5)
PROT SERPL-MCNC: 5.4 G/DL — LOW (ref 6–8)
RBC # BLD: 3.33 M/UL — LOW (ref 4.2–5.4)
RBC # FLD: 12.6 % — SIGNIFICANT CHANGE UP (ref 11.5–14.5)
RBC BLD AUTO: ABNORMAL
SODIUM SERPL-SCNC: 137 MMOL/L — SIGNIFICANT CHANGE UP (ref 135–146)
WBC # BLD: 5.32 K/UL — SIGNIFICANT CHANGE UP (ref 4.8–10.8)
WBC # FLD AUTO: 5.32 K/UL — SIGNIFICANT CHANGE UP (ref 4.8–10.8)

## 2021-03-25 PROCEDURE — 99233 SBSQ HOSP IP/OBS HIGH 50: CPT

## 2021-03-25 RX ORDER — POTASSIUM CHLORIDE 20 MEQ
40 PACKET (EA) ORAL ONCE
Refills: 0 | Status: COMPLETED | OUTPATIENT
Start: 2021-03-25 | End: 2021-03-25

## 2021-03-25 RX ORDER — MAGNESIUM SULFATE 500 MG/ML
2 VIAL (ML) INJECTION ONCE
Refills: 0 | Status: COMPLETED | OUTPATIENT
Start: 2021-03-25 | End: 2021-03-25

## 2021-03-25 RX ORDER — POTASSIUM CHLORIDE 20 MEQ
40 PACKET (EA) ORAL ONCE
Refills: 0 | Status: COMPLETED | OUTPATIENT
Start: 2021-03-25 | End: 2021-03-26

## 2021-03-25 RX ORDER — MAGNESIUM SULFATE 500 MG/ML
2 VIAL (ML) INJECTION ONCE
Refills: 0 | Status: DISCONTINUED | OUTPATIENT
Start: 2021-03-25 | End: 2021-03-27

## 2021-03-25 RX ORDER — ALBUTEROL 90 UG/1
1 AEROSOL, METERED ORAL EVERY 4 HOURS
Refills: 0 | Status: DISCONTINUED | OUTPATIENT
Start: 2021-03-25 | End: 2021-03-25

## 2021-03-25 RX ADMIN — APIXABAN 5 MILLIGRAM(S): 2.5 TABLET, FILM COATED ORAL at 18:03

## 2021-03-25 RX ADMIN — CHLORHEXIDINE GLUCONATE 1 APPLICATION(S): 213 SOLUTION TOPICAL at 05:42

## 2021-03-25 RX ADMIN — GABAPENTIN 100 MILLIGRAM(S): 400 CAPSULE ORAL at 01:41

## 2021-03-25 RX ADMIN — Medication 50 MILLIGRAM(S): at 05:42

## 2021-03-25 RX ADMIN — GABAPENTIN 100 MILLIGRAM(S): 400 CAPSULE ORAL at 21:54

## 2021-03-25 RX ADMIN — SODIUM CHLORIDE 100 MILLILITER(S): 9 INJECTION, SOLUTION INTRAVENOUS at 05:42

## 2021-03-25 RX ADMIN — APIXABAN 5 MILLIGRAM(S): 2.5 TABLET, FILM COATED ORAL at 05:42

## 2021-03-25 RX ADMIN — GABAPENTIN 100 MILLIGRAM(S): 400 CAPSULE ORAL at 05:42

## 2021-03-25 RX ADMIN — Medication 50 GRAM(S): at 11:46

## 2021-03-25 RX ADMIN — GABAPENTIN 100 MILLIGRAM(S): 400 CAPSULE ORAL at 17:45

## 2021-03-25 RX ADMIN — Medication 40 MILLIEQUIVALENT(S): at 11:46

## 2021-03-25 NOTE — CONSULT NOTE ADULT - ASSESSMENT
ASSESSMENT  HPI:  80 y/o female with PMhx Atrial fibrillation on eliquis, HTN, Asthma and Bronchitis presents to the ED with general malaise, dyspnea for 2 weeks    IMPRESSION  #COVID-19  - Moderate  - D-Dimer Assay, Quantitative: 275: Manufacturers recommended Cut off for VTE is 230 ng/ml D-DU ng/mL DDU (03.25.21 @ 11:43)    #Atrial Fibrillation  #HTN  #Asthma  #Obesity BMI (kg/m2): 27.5  #Abx allergy: NKDA    RECOMMENDATIONS  - agree with holding Dex 6 mg daily as on room air with no infiltrate on x ray  - continue supportive care  - follow-up rest of inflammatory markers    Please call or message on [employer] Teams if with any questions.  Spectra [phone]     ASSESSMENT  HPI:  80 y/o female with PMhx Atrial fibrillation on eliquis, HTN, Asthma and Bronchitis presents to the ED with general malaise, dyspnea for 2 weeks    IMPRESSION  #COVID-19  - Moderate  - D-Dimer Assay, Quantitative: 275: Manufacturers recommended Cut off for VTE is 230 ng/ml D-DU ng/mL DDU (03.25.21 @ 11:43)  - CT Abd/Pelvis with noted patchy infiltrates at bases     #Atrial Fibrillation  #HTN  #Asthma  #Obesity BMI (kg/m2): 27.5  #Abx allergy: NKDA    RECOMMENDATIONS  - agree with holding Dex 6 mg daily as on room air   - continue supportive care  - follow-up rest of inflammatory markers    Please call or message on [employer] Teams if with any questions.  Spectra [phone]

## 2021-03-25 NOTE — CONSULT NOTE ADULT - SUBJECTIVE AND OBJECTIVE BOX
GERMANIA GERMAIN  81y, Female  Allergy: No Known Allergies      CHIEF COMPLAINT: Abdominal Pain (25 Mar 2021 15:44)      LOS  1d    HPI:  80 y/o female with PMhx Atrial fibrillation on eliquis, HTN, Asthma and Bronchitis presents to the ED c/o "I don't feel well for the last 2 weeks. I feel weak, no appetite, nausea, diarrhea, cough, and SOB." Patient states that she last left her home 2 weeks ago to go the bank and has felt since. Noted loss of taste and smell, which led to her not wanting to eat. More recently patient's nausea and vomiting has become more pronounced. Patient came to the ED because she felt too weak to stay at home.     Denies headache, SOB, CP, changes in urination, leg pain or swelling.     Vital Signs Last 24 Hrs  T(C): 36.3 (24 Mar 2021 08:47), Max: 36.3 (24 Mar 2021 08:47)  T(F): 97.3 (24 Mar 2021 08:47), Max: 97.3 (24 Mar 2021 08:47)  HR: 78 (24 Mar 2021 08:47) (78 - 78)  BP: 121/64 (24 Mar 2021 08:47) (121/64 - 121/64)  BP(mean): --  RR: 19 (24 Mar 2021 08:47) (19 - 19)  SpO2: 97% (24 Mar 2021 08:47) (97% - 97%)    In the ED No CT evidence of an acute abdominopelvic pathology.  Patchy bibasilar peripheral groundglass opacities compatible with known Covid-19 infection. Right lower lobe 1.4 cm subpleural nodule versus focal atelectasis. Once acute condition resolves, further evaluation with an outpatient PET/CT is suggested.  Colonic diverticulosis without evidence for acute diverticulitis.       (24 Mar 2021 13:12)      INFECTIOUS DISEASE HISTORY:  History as above  Currently on room air  CXR 3/24 without infiltrate    PAST MEDICAL & SURGICAL HISTORY:  Atrial fibrillation    Bronchitis    Asthma    No significant past surgical history        FAMILY HISTORY  Famil hx reviewed and non-contributory     SOCIAL HISTORY  Social History:  drinks wine on occasion, denies smoking, or drug use (24 Mar 2021 13:12)        ROS  General: Denies rigors, nightsweats  HEENT: Denies headache, rhinorrhea, sore throat, eye pain  CV: Denies CP, palpitations  PULM: Denies wheezing, hemoptysis  GI: Denies hematemesis, hematochezia, melena  : Denies discharge, hematuria  MSK: Denies arthralgias, myalgias  SKIN: Denies rash, lesions  NEURO: Denies paresthesias, weakness  PSYCH: Denies depression, anxiety    VITALS:  T(F): 98.1, Max: 99.2 (03-24-21 @ 19:05)  HR: 61  BP: 117/58  RR: 18Vital Signs Last 24 Hrs  T(C): 36.7 (25 Mar 2021 13:48), Max: 37.3 (24 Mar 2021 19:05)  T(F): 98.1 (25 Mar 2021 13:48), Max: 99.2 (24 Mar 2021 19:05)  HR: 61 (25 Mar 2021 13:48) (61 - 79)  BP: 117/58 (25 Mar 2021 13:48) (117/58 - 142/61)  BP(mean): --  RR: 18 (25 Mar 2021 13:48) (18 - 18)  SpO2: 97% (25 Mar 2021 13:48) (96% - 98%)    PHYSICAL EXAM:  Gen: NAD, resting in bed  HEENT: Normocephalic, atraumatic  Neck: supple, no lymphadenopathy  CV: Regular rate & regular rhythm  Lungs: decreased BS at bases, no fremitus  Abdomen: Soft, BS present  Ext: Warm, well perfused  Neuro: non focal, awake  Skin: no rash, no erythema  Lines: no phlebitis    TESTS & MEASUREMENTS:                        10.6   5.32  )-----------( 158      ( 25 Mar 2021 07:09 )             30.8     03-25    137  |  103  |  12  ----------------------------<  95  3.4<L>   |  24  |  0.6<L>    Ca    7.8<L>      25 Mar 2021 07:09  Mg     1.7     03-25    TPro  5.4<L>  /  Alb  2.8<L>  /  TBili  0.4  /  DBili  x   /  AST  23  /  ALT  14  /  AlkPhos  46  03-25    eGFR if Non African American: 85 mL/min/1.73M2 (03-25-21 @ 07:09)  eGFR if : 98 mL/min/1.73M2 (03-25-21 @ 07:09)    LIVER FUNCTIONS - ( 25 Mar 2021 07:09 )  Alb: 2.8 g/dL / Pro: 5.4 g/dL / ALK PHOS: 46 U/L / ALT: 14 U/L / AST: 23 U/L / GGT: x                 Lactate, Blood: 1.4 mmol/L (03-24-21 @ 09:50)      INFECTIOUS DISEASES TESTING  COVID-19 PCR: Detected (03-24-21 @ 09:27)      RADIOLOGY & ADDITIONAL TESTS:  I have personally reviewed the last Chest xray  CXR  Xray Chest 1 View- PORTABLE-Urgent:   EXAM:  XR CHEST PORTABLE URGENT 1V            PROCEDURE DATE:  03/24/2021            INTERPRETATION:  Clinical History / Reason for exam: Cough    Comparison : Chest radiograph March 20, 2018.    Technique/Positioning: Single AP view of the chest.    Findings:    Support devices: None.    Cardiac/mediastinum/hilum: Unchanged    Lung parenchyma/Pleura: No consolidation, effusion or pneumothorax.    Skeleton/soft tissues: Unchanged.    Impression:    No radiographic evidence of acute cardiopulmonary disease.                  ELLIOT LANDAU MD; Attending Radiologist  This document has been electronically signed. Mar 24 2021  2:16PM (03-24-21 @ 10:25)      CT  CT Abdomen and Pelvis w/ IV Cont:   EXAM:  CT ABDOMEN AND PELVIS IC            PROCEDURE DATE:  03/24/2021            INTERPRETATION:  CLINICAL STATEMENT: Weakness, nausea and vomiting. Covid positive.    TECHNIQUE: Contiguous axial CT images were obtained from the lower chest to thepubic symphysis following administration of 100cc Optiray 320 intravenous contrast.  Oral contrast was not administered.  Reformatted images in the coronal and sagittal planes were acquired.    COMPARISON CT: None.    OTHER STUDIES USED FOR CORRELATION: March 20, 2018 CTA chest.      FINDINGS:    LOWER CHEST: Linear evaluation of the lung bases secondary to motion artifact. Patchy bibasilar peripheral groundglass opacities. Right lower lobe 1.4 cm subpleural nodule versus atelectasis (4/38). Cardiomegaly. Reflux of contrast into the IVC and hepatic veins.    HEPATOBILIARY: Unremarkable.    SPLEEN: Unremarkable.    PANCREAS: Unremarkable.    ADRENAL GLANDS: Unremarkable.    KIDNEYS: Symmetric renal enhancement bilaterally. No hydronephrosis.    ABDOMINOPELVIC NODES: No lymphadenopathy.    PELVIC ORGANS: Post hysterectomy.    PERITONEUM/MESENTERY/BOWEL: No bowel obstruction, ascites or pneumoperitoneum. Diffuse colonic diverticulosis without evidence for acute diverticulitis. Surgical clipsseen near the gastroesophageal junction.    BONES/SOFT TISSUES: Diffuse osteopenia. No acute osseous abnormality.    OTHER: Atherosclerotic vascular calcifications.      IMPRESSION:  1.  No CT evidence of an acute abdominopelvic pathology.    2.  Patchy bibasilar peripheral groundglass opacities compatible with known Covid-19 infection.    3.  Right lower lobe 1.4 cm subpleural nodule versus focal atelectasis. Once acute condition resolves, further evaluation with an outpatient PET/CT is suggested.    4.  Colonic diverticulosis without evidence for acute diverticulitis.              EVELIN MURRY MD; Attending Radiologist  This document has been electronically signed. Mar 24 2021 12:22PM (03-24-21 @ 12:04)      CARDIOLOGY TESTING  12 Lead ECG:   Ventricular Rate 72 BPM    Atrial Rate 76 BPM    QRS Duration 82 ms    Q-T Interval 436 ms    QTC Calculation(Bazett) 477 ms    R Axis 16 degrees    T Axis 37 degrees    Diagnosis Line Atrial fibrillation  Low voltage QRS  Septal infarct , age undetermined  Abnormal ECG    Confirmed by Chandana Aleman ([id number]) on 3/24/2021 1:22:41 PM (03-24-21 @ 10:28)      MEDICATIONS  apixaban 5 Oral every 12 hours  chlorhexidine 4% Liquid 1 Topical <User Schedule>  gabapentin 100 Oral three times a day  magnesium sulfate  IVPB 2 IV Intermittent once  metoprolol tartrate 50 Oral daily  potassium chloride    Tablet ER 40 Oral once  tiotropium 18 MICROgram(s) Capsule 1 Inhalation daily      Weight  Weight (kg): 75 (03-25-21 @ 05:00)    ANTIBIOTICS:      ALLERGIES:  No Known Allergies

## 2021-03-25 NOTE — PHYSICAL THERAPY INITIAL EVALUATION ADULT - CRITERIA FOR SKILLED THERAPEUTIC INTERVENTIONS
impairments found/functional limitations in following categories/risk reduction/prevention/rehab potential/therapy frequency/predicted duration of therapy intervention/anticipated equipment needs at discharge

## 2021-03-25 NOTE — PHYSICAL THERAPY INITIAL EVALUATION ADULT - LIVES WITH, PROFILE
pt lives alone in condo , no HELGA, no steps inside. Pt stated she has 12 cats living with her./alone

## 2021-03-25 NOTE — PHYSICAL THERAPY INITIAL EVALUATION ADULT - GENERAL OBSERVATIONS, REHAB EVAL
Pt encountered semifowler in bed in NAD, + IV L UE, no complaints, agreeable to PT. 10:30-11:00 . Pt encountered semifowler in bed in NAD, + IV L UE, no complaints, agreeable to PT.

## 2021-03-26 LAB
COVID-19 SPIKE DOMAIN AB INTERP: POSITIVE
COVID-19 SPIKE DOMAIN ANTIBODY RESULT: 0.87 U/ML — HIGH
CRP SERPL-MCNC: 12 MG/L — HIGH
FERRITIN SERPL-MCNC: 822 NG/ML — HIGH (ref 15–150)
PROCALCITONIN SERPL-MCNC: 0.06 NG/ML — SIGNIFICANT CHANGE UP (ref 0.02–0.1)
SARS-COV-2 IGG+IGM SERPL QL IA: 0.87 U/ML — HIGH
SARS-COV-2 IGG+IGM SERPL QL IA: POSITIVE

## 2021-03-26 RX ADMIN — APIXABAN 5 MILLIGRAM(S): 2.5 TABLET, FILM COATED ORAL at 05:29

## 2021-03-26 RX ADMIN — GABAPENTIN 100 MILLIGRAM(S): 400 CAPSULE ORAL at 21:42

## 2021-03-26 RX ADMIN — Medication 40 MILLIEQUIVALENT(S): at 17:07

## 2021-03-26 RX ADMIN — GABAPENTIN 100 MILLIGRAM(S): 400 CAPSULE ORAL at 11:04

## 2021-03-26 RX ADMIN — APIXABAN 5 MILLIGRAM(S): 2.5 TABLET, FILM COATED ORAL at 17:07

## 2021-03-26 RX ADMIN — Medication 50 MILLIGRAM(S): at 05:29

## 2021-03-26 RX ADMIN — CHLORHEXIDINE GLUCONATE 1 APPLICATION(S): 213 SOLUTION TOPICAL at 05:29

## 2021-03-26 RX ADMIN — GABAPENTIN 100 MILLIGRAM(S): 400 CAPSULE ORAL at 05:29

## 2021-03-26 NOTE — CHART NOTE - NSCHARTNOTEFT_GEN_A_CORE
[place]. no complaints at this time. VSS.
I made rounds today with the treatment team including the hospitalist, residents,  nurses and  and discussed the patient's current medical status and discharge  planning needs, and reviewed the chart.    T(C): 37 (03-25-21 @ 05:00), Max: 37.5 (03-24-21 @ 16:17)  HR: 71 (03-25-21 @ 05:00) (62 - 79)  BP: 118/56 (03-25-21 @ 05:00) (118/56 - 142/61)  RR: 18 (03-25-21 @ 05:00) (18 - 18)  SpO2: 96% (03-25-21 @ 07:54) (96% - 98%)          I reached out to the patient's health care proxy/ responsible family member-           [  x   ]  I reached   Parrish ( friend ) [phone]      and discussed the patient's medical condition,                   family concerns, and discharge planning           [     ]  I left a message with family               [     ]  I personally participated in rounds with the medical team and my resident and discussed the case. My resident reached                   family member/ HCP                                under my direction and supervision  and we reviewed the conversation          [     ]  My resident left a message with family under my direction and supervision                [     ]   My resident attended rounds and called the family     The following was discussed: vitals / respiratory status / CXR and CT abdomen results / ID eval                                             Friend wants to inform pt that her cats are taken care of         [     ] I spent 5-10 minutes on the above discussing medical issues with team members and family and/ or my resident    [     ] I spent 11-20 minutes on the above discussing medical issues with team members and family and/ or my resident    [  x   ] I spent 21-30 minutes on the above discussing medical issues with team members and family and/ or my resident

## 2021-03-27 ENCOUNTER — TRANSCRIPTION ENCOUNTER (OUTPATIENT)
Age: 81
End: 2021-03-27

## 2021-03-27 VITALS
TEMPERATURE: 98 F | DIASTOLIC BLOOD PRESSURE: 63 MMHG | OXYGEN SATURATION: 95 % | SYSTOLIC BLOOD PRESSURE: 131 MMHG | HEART RATE: 90 BPM | RESPIRATION RATE: 18 BRPM

## 2021-03-27 LAB
ALBUMIN SERPL ELPH-MCNC: 3.2 G/DL — LOW (ref 3.5–5.2)
ALP SERPL-CCNC: 58 U/L — SIGNIFICANT CHANGE UP (ref 30–115)
ALT FLD-CCNC: 16 U/L — SIGNIFICANT CHANGE UP (ref 0–41)
ANION GAP SERPL CALC-SCNC: 10 MMOL/L — SIGNIFICANT CHANGE UP (ref 7–14)
AST SERPL-CCNC: 24 U/L — SIGNIFICANT CHANGE UP (ref 0–41)
BASOPHILS # BLD AUTO: 0.02 K/UL — SIGNIFICANT CHANGE UP (ref 0–0.2)
BASOPHILS NFR BLD AUTO: 0.2 % — SIGNIFICANT CHANGE UP (ref 0–1)
BILIRUB SERPL-MCNC: 0.3 MG/DL — SIGNIFICANT CHANGE UP (ref 0.2–1.2)
BUN SERPL-MCNC: 15 MG/DL — SIGNIFICANT CHANGE UP (ref 10–20)
CALCIUM SERPL-MCNC: 8.3 MG/DL — LOW (ref 8.5–10.1)
CHLORIDE SERPL-SCNC: 106 MMOL/L — SIGNIFICANT CHANGE UP (ref 98–110)
CO2 SERPL-SCNC: 23 MMOL/L — SIGNIFICANT CHANGE UP (ref 17–32)
CREAT SERPL-MCNC: 0.7 MG/DL — SIGNIFICANT CHANGE UP (ref 0.7–1.5)
EOSINOPHIL # BLD AUTO: 0.08 K/UL — SIGNIFICANT CHANGE UP (ref 0–0.7)
EOSINOPHIL NFR BLD AUTO: 0.9 % — SIGNIFICANT CHANGE UP (ref 0–8)
GLUCOSE SERPL-MCNC: 97 MG/DL — SIGNIFICANT CHANGE UP (ref 70–99)
HCT VFR BLD CALC: 35.3 % — LOW (ref 37–47)
HGB BLD-MCNC: 11.9 G/DL — LOW (ref 12–16)
IMM GRANULOCYTES NFR BLD AUTO: 0.3 % — SIGNIFICANT CHANGE UP (ref 0.1–0.3)
LYMPHOCYTES # BLD AUTO: 2.6 K/UL — SIGNIFICANT CHANGE UP (ref 1.2–3.4)
LYMPHOCYTES # BLD AUTO: 30 % — SIGNIFICANT CHANGE UP (ref 20.5–51.1)
MAGNESIUM SERPL-MCNC: 1.6 MG/DL — LOW (ref 1.8–2.4)
MCHC RBC-ENTMCNC: 31.5 PG — HIGH (ref 27–31)
MCHC RBC-ENTMCNC: 33.7 G/DL — SIGNIFICANT CHANGE UP (ref 32–37)
MCV RBC AUTO: 93.4 FL — SIGNIFICANT CHANGE UP (ref 81–99)
MONOCYTES # BLD AUTO: 0.44 K/UL — SIGNIFICANT CHANGE UP (ref 0.1–0.6)
MONOCYTES NFR BLD AUTO: 5.1 % — SIGNIFICANT CHANGE UP (ref 1.7–9.3)
NEUTROPHILS # BLD AUTO: 5.51 K/UL — SIGNIFICANT CHANGE UP (ref 1.4–6.5)
NEUTROPHILS NFR BLD AUTO: 63.5 % — SIGNIFICANT CHANGE UP (ref 42.2–75.2)
NRBC # BLD: 0 /100 WBCS — SIGNIFICANT CHANGE UP (ref 0–0)
PLATELET # BLD AUTO: 189 K/UL — SIGNIFICANT CHANGE UP (ref 130–400)
POTASSIUM SERPL-MCNC: 5.3 MMOL/L — HIGH (ref 3.5–5)
POTASSIUM SERPL-SCNC: 5.3 MMOL/L — HIGH (ref 3.5–5)
PROT SERPL-MCNC: 6.4 G/DL — SIGNIFICANT CHANGE UP (ref 6–8)
RBC # BLD: 3.78 M/UL — LOW (ref 4.2–5.4)
RBC # FLD: 12.9 % — SIGNIFICANT CHANGE UP (ref 11.5–14.5)
SODIUM SERPL-SCNC: 139 MMOL/L — SIGNIFICANT CHANGE UP (ref 135–146)
WBC # BLD: 8.68 K/UL — SIGNIFICANT CHANGE UP (ref 4.8–10.8)
WBC # FLD AUTO: 8.68 K/UL — SIGNIFICANT CHANGE UP (ref 4.8–10.8)

## 2021-03-27 RX ORDER — MAGNESIUM SULFATE 500 MG/ML
2 VIAL (ML) INJECTION ONCE
Refills: 0 | Status: COMPLETED | OUTPATIENT
Start: 2021-03-27 | End: 2021-03-27

## 2021-03-27 RX ADMIN — GABAPENTIN 100 MILLIGRAM(S): 400 CAPSULE ORAL at 06:14

## 2021-03-27 RX ADMIN — Medication 50 MILLIGRAM(S): at 06:15

## 2021-03-27 RX ADMIN — APIXABAN 5 MILLIGRAM(S): 2.5 TABLET, FILM COATED ORAL at 17:21

## 2021-03-27 RX ADMIN — CHLORHEXIDINE GLUCONATE 1 APPLICATION(S): 213 SOLUTION TOPICAL at 06:15

## 2021-03-27 RX ADMIN — GABAPENTIN 100 MILLIGRAM(S): 400 CAPSULE ORAL at 13:54

## 2021-03-27 RX ADMIN — APIXABAN 5 MILLIGRAM(S): 2.5 TABLET, FILM COATED ORAL at 06:15

## 2021-03-27 RX ADMIN — Medication 50 GRAM(S): at 11:48

## 2021-03-27 NOTE — PROGRESS NOTE ADULT - ASSESSMENT
80yo F c PMHx chronic Afib on Eliquis, HTN, asthma here with dehydration and worsening functional debility 2/2 covid 19 viral infection, incidentally noted to have RLL pulmonary nodule      # Weakness/ Abdominal discomfort secondary to COVID 19  - currently on RA  - No CT evidence of an acute abdominopelvic pathology  - Patchy bibasilar peripheral ground-glass opacities compatible with known Covid-19 infection  - Right lower lobe 1.4 cm subpleural nodule versus focal atelectasis (Once acute condition resolves, further evaluation with an outpatient PET/CT is suggested)    - Colonic diverticulosis without evidence for acute diverticulitis  - chest xray- No radiographic evidence of acute cardiopulmonary disease.  - FU Ferritin, CRP, Procal, D-dimer  - 92% RA  - ID consult : No need for RDV or Dex   - PT: Home with [facility]    #Chronic Afib  - c/w eliquis and metoprolol    #Asthma  - duonebs PRN    HTN:  - cont metoprolol   - holding lasix held for now until PO intake improved and off IVF     Chronic Bronchitis/Asthma:   - Cont albuterol PRN     Neuropathic leg pain:   -  continue start a trial of gabapentin 100mg TID    #MISC  DIET: DASH  DVT:  Eliquis  Dispo: Home with [facility]  
82yo F c PMHx chronic Afib on Eliquis, HTN, asthma here with dehydration and worsening functional debility 2/2 covid 19 viral infection, incidentally noted to have RLL pulmonary nodule      # Weakness/ Abdominal discomfort secondary to COVID 19  - currently on RA  - No CT evidence of an acute abdominopelvic pathology  - Patchy bibasilar peripheral ground-glass opacities compatible with known Covid-19 infection  - Right lower lobe 1.4 cm subpleural nodule versus focal atelectasis (Once acute condition resolves, further evaluation with an outpatient PET/CT is suggested)    - Colonic diverticulosis without evidence for acute diverticulitis  - chest xray- No radiographic evidence of acute cardiopulmonary disease.  - FU Ferritin, CRP, Procal, D-dimer  - 92% RA  - ID consult : No need for RDV or Dex   - PT pending    #Chronic Afib  - c/w eliquis and metoprolol    #Asthma  - duonebs PRN    HTN:  - cont metoprolol   - holding lasix held for now until PO intake improved and off IVF     Chronic Bronchitis/Asthma:   - Cont albuterol PRN     Neuropathic leg pain:   -  continue start a trial of gabapentin 100mg TID    #MISC  DIET: DASH  DVT:  Eliquis  Dispo: Home   
82yo F c PMHx chronic Afib on noac, HTN, asthma here with dehydration and worsening functional debility 2/2 covid 19 viral infection, incidentally noted to have RLL pulmonary nodule    gentle IVH  encourage po intake  monitor o2 requirements, no indication for remdesivir or dexamethasone at this point  transfer to [facility] for further care/ hydration/ and gait rehab  outpatient pulmonary follow up and interval reimaging for RLL nodule  continue with home noac  asthma controlled/ no active bronchospasm on exam today  PT    #Progress Note Handoff    Pending (specify):  transfer to [facility]    Family discussion: see communications note, friend Parrish informed of care today    Disposition: EAST 
82 y/o female with hx Atrial fibrillation on eliquis, HTN presents to the ED c/o "I don't feel well for the last 2 weeks. I feel weak, no appetite, nausea, diarrhea, cough, and SOB."     # Weakness/ Abdominal discomfort secondary to COVID 19  - currently on RA  - No CT evidence of an acute abdominopelvic pathology  - Patchy bibasilar peripheral ground-glass opacities compatible with known Covid-19 infection  - Right lower lobe 1.4 cm subpleural nodule versus focal atelectasis (Once acute condition resolves, further evaluation with an outpatient PET/CT is suggested)    - Colonic diverticulosis without evidence for acute diverticulitis  - chest xray- No radiographic evidence of acute cardiopulmonary disease.  - FU Ferritin, CRP, Procal, D-dimer  - due to no  oxygen demand no need for covid treatment at this time  - ID consult placed  - PT eval     #Chronic Afib  - c/w eliquis and metoprolol    #Asthma  - duonebs PRN    HTN:  - cont metoprolol   - holding lasix held for now until PO intake improved and off IVF     Chronic Bronchitis/Asthma:   - Cont albuterol PRN     Neuropathic leg pain:   -  continue start a trial of gabapentin 100mg TID    #MISC  DIET: DASH  DVT:  Eliquis  Dispo: Acute  [facility]

## 2021-03-27 NOTE — DISCHARGE NOTE PROVIDER - NSDCMRMEDTOKEN_GEN_ALL_CORE_FT
Eliquis 5 mg oral tablet: 2 tab(s) orally 2 times a day   furosemide 20 mg oral tablet: 1 tab(s) orally once a day  Metoprolol Tartrate 50 mg oral tablet: 1 tab(s) orally once  Vitamin D3 5000 intl units (125 mcg) oral tablet: 1 tab(s) orally once a week

## 2021-03-27 NOTE — DISCHARGE NOTE NURSING/CASE MANAGEMENT/SOCIAL WORK - PATIENT PORTAL LINK FT
You can access the FollowMyHealth Patient Portal offered by [facility] by registering at the following website: [email or web address]. By joining [employer]’s FollowMyHealth portal, you will also be able to view your health information using other applications (apps) compatible with our system.

## 2021-03-27 NOTE — DISCHARGE NOTE PROVIDER - NSDCFUADDAPPT_GEN_ALL_CORE_FT
Please follow up with your primary care provider regarding your recent hospitalization 1-2 weeks after discharge.

## 2021-03-27 NOTE — DISCHARGE NOTE PROVIDER - NSDCCPCAREPLAN_GEN_ALL_CORE_FT
PRINCIPAL DISCHARGE DIAGNOSIS  Diagnosis: Decreased oral intake  Assessment and Plan of Treatment:       SECONDARY DISCHARGE DIAGNOSES  Diagnosis: Weakness  Assessment and Plan of Treatment:     Diagnosis: COVID-19  Assessment and Plan of Treatment:

## 2021-03-27 NOTE — DISCHARGE NOTE PROVIDER - HOSPITAL COURSE
82yo F c PMHx chronic Afib on Eliquis, HTN, asthma here with dehydration and worsening functional debility 2/2 covid 19 viral infection, incidentally noted to have RLL pulmonary nodule. CT on admission showed Patchy bibasilar peripheral ground-glass opacities compatible with known Covid-19 infection, of note Right lower lobe 1.4 cm subpleural nodule versus focal atelectasis (Once acute condition resolves, further evaluation with an outpatient PET/CT is suggested), no evidence of abdominal/pelvic pathology so abdominal discomfort likely secondary to COVID infection.  Infectious disease was consulted, patient is not candidate for RDV or Dex as patient has been on RA since transfer from emergency department. Patient had furosemide held during this admission due to dehydration, patient can resume home meds after discharge. Please follow up with your primary care provider 1-2 weeks after discharge and have a repeat CT to monitor subpleural nodule. Patient was seen and evaluated and deemed medically stable for discharge with home with home care services.

## 2021-03-27 NOTE — PROGRESS NOTE ADULT - SUBJECTIVE AND OBJECTIVE BOX
GERMANIA GERMAIN  81y  Female      Patient is a 81y old  Female who presents with a chief complaint of Abdominal Pain (25 Mar 2021 11:09)      INTERVAL HPI/OVERNIGHT EVENTS: appetite improving but still quite weak      REVIEW OF SYSTEMS:  as above  All other review of systems negative    T(C): 36.7 (03-25-21 @ 13:48), Max: 37.5 (03-24-21 @ 16:17)  HR: 61 (03-25-21 @ 13:48) (61 - 79)  BP: 117/58 (03-25-21 @ 13:48) (117/58 - 142/61)  RR: 18 (03-25-21 @ 13:48) (18 - 18)  SpO2: 97% (03-25-21 @ 13:48) (96% - 98%)  Wt(kg): --Vital Signs Last 24 Hrs  T(C): 36.7 (25 Mar 2021 13:48), Max: 37.5 (24 Mar 2021 16:17)  T(F): 98.1 (25 Mar 2021 13:48), Max: 99.5 (24 Mar 2021 16:17)  HR: 61 (25 Mar 2021 13:48) (61 - 79)  BP: 117/58 (25 Mar 2021 13:48) (117/58 - 142/61)  BP(mean): --  RR: 18 (25 Mar 2021 13:48) (18 - 18)  SpO2: 97% (25 Mar 2021 13:48) (96% - 98%)        PHYSICAL EXAM:  GENERAL: deconditioned  PSYCH: no agitation, baseline mentation  NERVOUS SYSTEM:  Alert & Oriented X3, no new focal deficits  PULMONARY: Clear to percussion bilaterally; No rales, rhonchi, wheezing, or rubs  CARDIOVASCULAR: irregular; No murmurs, rubs, or gallops  GI: Soft, Nontender, Nondistended; Bowel sounds present  EXTREMITIES:  2+ Peripheral Pulses, No clubbing, cyanosis, or edema  SKIN a bit dry    Consultant(s) Notes Reviewed:  [x ] YES  [ ] NO    Discussed with Consultants/Other Providers [ x] YES     LABS                          10.6   5.32  )-----------( 158      ( 25 Mar 2021 07:09 )             30.8     03-25    137  |  103  |  12  ----------------------------<  95  3.4<L>   |  24  |  0.6<L>    Ca    7.8<L>      25 Mar 2021 07:09  Mg     1.7     03-25    TPro  5.4<L>  /  Alb  2.8<L>  /  TBili  0.4  /  DBili  x   /  AST  23  /  ALT  14  /  AlkPhos  46  03-25          Lactate Trend  03-24 @ 09:50 Lactate:1.4     CARDIAC MARKERS ( 24 Mar 2021 09:50 )  x     / <0.01 ng/mL / x     / x     / x          CAPILLARY BLOOD GLUCOSE            RADIOLOGY & ADDITIONAL TESTS:    Imaging Personally Reviewed:  [ ] YES  [ ] NO    HEALTH ISSUES - PROBLEM Dx:        
Name: GERMANIA GERMAIN  Age: 81y  Gender: Female      Pt was seen and examined at bedside. Pt has no complaint and was transferred this AM      Allergies:  No Known Allergies      PHYSICAL EXAM:    Vitals:  Vital Signs Last 24 Hrs  T(C): 37 (26 Mar 2021 15:59), Max: 37 (26 Mar 2021 06:13)  T(F): 98.6 (26 Mar 2021 15:59), Max: 98.6 (26 Mar 2021 06:13)  HR: 70 (26 Mar 2021 15:59) (59 - 70)  BP: 127/64 (26 Mar 2021 15:59) (127/64 - 147/65)  BP(mean): --  RR: 19 (26 Mar 2021 15:59) (18 - 19)  SpO2: 95% (26 Mar 2021 06:13) (95% - 98%)    PHYSICAL EXAM:  GENERAL: Alert  PULMONARY: Clear to percussion bilaterally; No rales, rhonchi, wheezing, or rubs  CARDIOVASCULAR: irregular; No murmurs, rubs, or gallops  GI: Soft, Nontender, Nondistended; Bowel sounds present  EXTREMITIES:  2+ Peripheral Pulses, No clubbing, cyanosis, or edema  NERVOUS SYSTEM:  Alert & Oriented X3, no new focal deficits                        10.6   5.32  )-----------( 158      ( 25 Mar 2021 07:09 )             30.8     03-25    137  |  103  |  12  ----------------------------<  95  3.4<L>   |  24  |  0.6<L>    Ca    7.8<L>      25 Mar 2021 07:09  Mg     1.7     03-25    TPro  5.4<L>  /  Alb  2.8<L>  /  TBili  0.4  /  DBili  x   /  AST  23  /  ALT  14  /  AlkPhos  46  03-25    LIVER FUNCTIONS - ( 25 Mar 2021 07:09 )  Alb: 2.8 g/dL / Pro: 5.4 g/dL / ALK PHOS: 46 U/L / ALT: 14 U/L / AST: 23 U/L / GGT: x                 MEDICATIONS  (STANDING):  apixaban 5 milliGRAM(s) Oral every 12 hours  chlorhexidine 4% Liquid 1 Application(s) Topical <User Schedule>  gabapentin 100 milliGRAM(s) Oral three times a day  magnesium sulfate  IVPB 2 Gram(s) IV Intermittent once  metoprolol tartrate 50 milliGRAM(s) Oral daily  potassium chloride    Tablet ER 40 milliEquivalent(s) Oral once  tiotropium 18 MICROgram(s) Capsule 1 Capsule(s) Inhalation daily        RADIOLOGY & ADDITIONAL TESTS:    Imaging Personally Reviewed:  [x] YES  [ ] NO
SUBJECTIVE:    Patient is a 81y old Female who presents with a chief complaint of Abdominal Pain (24 Mar 2021 13:12)    Currently admitted to medicine with the primary diagnosis of: COVID    Today is hospital day 1d.     Overnight Events:     No acute overnight events.     PAST MEDICAL & SURGICAL HISTORY  Atrial fibrillation    Bronchitis    Asthma    No significant past surgical history    ALLERGIES:  No Known Allergies    MEDICATIONS:  STANDING MEDICATIONS  apixaban 5 milliGRAM(s) Oral every 12 hours  chlorhexidine 4% Liquid 1 Application(s) Topical <User Schedule>  gabapentin 100 milliGRAM(s) Oral three times a day  lactated ringers. 500 milliLiter(s) IV Continuous <Continuous>  magnesium sulfate  IVPB 2 Gram(s) IV Intermittent once  metoprolol tartrate 50 milliGRAM(s) Oral daily  potassium chloride    Tablet ER 40 milliEquivalent(s) Oral once  tiotropium 18 MICROgram(s) Capsule 1 Capsule(s) Inhalation daily    PRN MEDICATIONS    VITALS:   ICU Vital Signs Last 24 Hrs  T(C): 37 (25 Mar 2021 05:00), Max: 37.5 (24 Mar 2021 16:17)  T(F): 98.6 (25 Mar 2021 05:00), Max: 99.5 (24 Mar 2021 16:17)  HR: 71 (25 Mar 2021 05:00) (62 - 79)  BP: 118/56 (25 Mar 2021 05:00) (118/56 - 142/61)  RR: 18 (25 Mar 2021 05:00) (18 - 18)  SpO2: 96% (25 Mar 2021 07:54) (96% - 98%)      LABS:                        10.6   5.32  )-----------( 158      ( 25 Mar 2021 07:09 )             30.8     03-25    137  |  103  |  12  ----------------------------<  95  3.4<L>   |  24  |  0.6<L>    Ca    7.8<L>      25 Mar 2021 07:09  Mg     1.7     03-25    TPro  5.4<L>  /  Alb  2.8<L>  /  TBili  0.4  /  DBili  x   /  AST  23  /  ALT  14  /  AlkPhos  46  03-25    CARDIAC MARKERS ( 24 Mar 2021 09:50 )  x     / <0.01 ng/mL / x     / x     / x          RADIOLOGY:      < from: CT Abdomen and Pelvis w/ IV Cont (03.24.21 @ 12:04) >  IMPRESSION:  1.  No CT evidence of an acute abdominopelvic pathology.    2.  Patchy bibasilar peripheral groundglass opacities compatible with known Covid-19 infection.    3.  Right lower lobe 1.4 cm subpleural nodule versus focal atelectasis. Once acute condition resolves, further evaluation with an outpatient PET/CT is suggested.    4.  Colonic diverticulosis without evidence for acute diverticulitis.    < end of copied text >      < from: Xray Chest 1 View- PORTABLE-Urgent (03.24.21 @ 10:25) >  Impression:    No radiographic evidence of acute cardiopulmonary disease.    < end of copied text >    PHYSICAL EXAM:    GENERAL: NAD, lying in bed comfortably  CHEST/LUNG: Clear to auscultation bilaterally; No rales, rhonchi, wheezing, or rubs. Unlabored respirations  HEART: Regular rate and rhythm; No murmurs, rubs, or gallops  ABDOMEN: Bowel sounds present; Soft, Nontender, Nondistended. No hepatomegally  EXTREMITIES:  2+ Peripheral Pulses, brisk capillary refill. No clubbing, cyanosis, or edema  NERVOUS SYSTEM:  Alert & Oriented X3, speech clear. No deficits   MSK: FROM all 4 extremities, full and equal strength  SKIN: No rashes or lesions  `        
Name: GERMANIA GERMAIN  Age: 81y  Gender: Female      Pt was seen and examined at bedside. Pt has no complaint and was yesterday from [facility]. Patient is on RA sating at 97%. Patient expresses some anxiety over going home today, as she lives alone.      Allergies:  No Known Allergies      PHYSICAL EXAM:    Vitals:  Vital Signs Last 24 Hrs  T(C): 37 (26 Mar 2021 15:59), Max: 37 (26 Mar 2021 06:13)  T(F): 98.6 (26 Mar 2021 15:59), Max: 98.6 (26 Mar 2021 06:13)  HR: 70 (26 Mar 2021 15:59) (59 - 70)  BP: 127/64 (26 Mar 2021 15:59) (127/64 - 147/65)  BP(mean): --  RR: 19 (26 Mar 2021 15:59) (18 - 19)  SpO2: 95% (26 Mar 2021 06:13) (95% - 98%)    PHYSICAL EXAM:  GENERAL: Alert  PULMONARY: Clear to percussion bilaterally; No rales, rhonchi, wheezing, or rubs  CARDIOVASCULAR: irregular; No murmurs, rubs, or gallops  GI: Soft, Nontender, Nondistended; Bowel sounds present  EXTREMITIES:  2+ Peripheral Pulses, No clubbing, cyanosis, or edema  NERVOUS SYSTEM:  Alert & Oriented X3, no new focal deficits                        10.6   5.32  )-----------( 158      ( 25 Mar 2021 07:09 )             30.8     03-25    137  |  103  |  12  ----------------------------<  95  3.4<L>   |  24  |  0.6<L>    Ca    7.8<L>      25 Mar 2021 07:09  Mg     1.7     03-25    TPro  5.4<L>  /  Alb  2.8<L>  /  TBili  0.4  /  DBili  x   /  AST  23  /  ALT  14  /  AlkPhos  46  03-25    LIVER FUNCTIONS - ( 25 Mar 2021 07:09 )  Alb: 2.8 g/dL / Pro: 5.4 g/dL / ALK PHOS: 46 U/L / ALT: 14 U/L / AST: 23 U/L / GGT: x                 MEDICATIONS  (STANDING):  apixaban 5 milliGRAM(s) Oral every 12 hours  chlorhexidine 4% Liquid 1 Application(s) Topical <User Schedule>  gabapentin 100 milliGRAM(s) Oral three times a day  magnesium sulfate  IVPB 2 Gram(s) IV Intermittent once  metoprolol tartrate 50 milliGRAM(s) Oral daily  potassium chloride    Tablet ER 40 milliEquivalent(s) Oral once  tiotropium 18 MICROgram(s) Capsule 1 Capsule(s) Inhalation daily        RADIOLOGY & ADDITIONAL TESTS:    Imaging Personally Reviewed:  [x] YES  [ ] NO

## 2021-03-27 NOTE — DISCHARGE NOTE PROVIDER - NSDCFUADDINST_GEN_ALL_CORE_FT
You have tested POSITIVE for the novel coronavirus (COVID-19). Upon discharge, you must self-quarantine for 14 days, or until the Department of Health contacts you. Please wear a face mask if you are around other individuals. Try to avoid contact with house members, family, and friends for the duration of this quarantine. Please follow up with your primary care physician within 2-3 weeks of your discharge from the hospital. Please take all medications as prescribed. If you experience any worsening or recurrence of your symptoms, particularly worsening or high fever, shortness of breathe, extreme fatigue, or bloody cough please call [phone] immediately or report to the nearest Emergency Department.

## 2021-03-28 ENCOUNTER — TRANSCRIPTION ENCOUNTER (OUTPATIENT)
Age: 81
End: 2021-03-28

## 2021-03-30 LAB
CULTURE RESULTS: SIGNIFICANT CHANGE UP
CULTURE RESULTS: SIGNIFICANT CHANGE UP
SPECIMEN SOURCE: SIGNIFICANT CHANGE UP
SPECIMEN SOURCE: SIGNIFICANT CHANGE UP

## 2021-04-02 DIAGNOSIS — E86.0 DEHYDRATION: ICD-10-CM

## 2021-04-02 DIAGNOSIS — G57.90 UNSPECIFIED MONONEUROPATHY OF UNSPECIFIED LOWER LIMB: ICD-10-CM

## 2021-04-02 DIAGNOSIS — R91.1 SOLITARY PULMONARY NODULE: ICD-10-CM

## 2021-04-02 DIAGNOSIS — J45.909 UNSPECIFIED ASTHMA, UNCOMPLICATED: ICD-10-CM

## 2021-04-02 DIAGNOSIS — U07.1 COVID-19: ICD-10-CM

## 2021-04-02 DIAGNOSIS — K57.90 DIVERTICULOSIS OF INTESTINE, PART UNSPECIFIED, WITHOUT PERFORATION OR ABSCESS WITHOUT BLEEDING: ICD-10-CM

## 2021-04-02 DIAGNOSIS — I10 ESSENTIAL (PRIMARY) HYPERTENSION: ICD-10-CM

## 2021-04-02 DIAGNOSIS — J44.9 CHRONIC OBSTRUCTIVE PULMONARY DISEASE, UNSPECIFIED: ICD-10-CM

## 2021-04-02 DIAGNOSIS — I48.20 CHRONIC ATRIAL FIBRILLATION, UNSPECIFIED: ICD-10-CM

## 2021-04-02 DIAGNOSIS — E66.9 OBESITY, UNSPECIFIED: ICD-10-CM

## 2021-04-02 DIAGNOSIS — Z79.01 LONG TERM (CURRENT) USE OF ANTICOAGULANTS: ICD-10-CM

## 2021-04-02 DIAGNOSIS — R62.7 ADULT FAILURE TO THRIVE: ICD-10-CM

## 2022-06-06 NOTE — ASU DISCHARGE PLAN (ADULT/PEDIATRIC) - FREQUENT HAND WASHING PREVENTS THE SPREAD OF INFECTION.
Black Co-Hosh  Vit V27  Collagen  Folic Acid with Vit C  Fish oil  Ashwagandha  Magnesium Statement Selected

## 2022-07-21 ENCOUNTER — NON-APPOINTMENT (OUTPATIENT)
Age: 82
End: 2022-07-21

## 2022-11-23 ENCOUNTER — APPOINTMENT (OUTPATIENT)
Dept: CARDIOLOGY | Facility: CLINIC | Age: 82
End: 2022-11-23

## 2022-11-23 VITALS
RESPIRATION RATE: 14 BRPM | TEMPERATURE: 97.7 F | HEART RATE: 81 BPM | DIASTOLIC BLOOD PRESSURE: 62 MMHG | WEIGHT: 122 LBS | SYSTOLIC BLOOD PRESSURE: 140 MMHG | OXYGEN SATURATION: 97 % | BODY MASS INDEX: 20.33 KG/M2 | HEIGHT: 65 IN

## 2022-11-23 DIAGNOSIS — L60.2 ONYCHOGRYPHOSIS: ICD-10-CM

## 2022-11-23 DIAGNOSIS — R55 SYNCOPE AND COLLAPSE: ICD-10-CM

## 2022-11-23 PROCEDURE — 93000 ELECTROCARDIOGRAM COMPLETE: CPT

## 2022-11-23 PROCEDURE — ZZZZZ: CPT

## 2022-11-23 PROCEDURE — 99204 OFFICE O/P NEW MOD 45 MIN: CPT

## 2022-11-26 PROBLEM — R55 SYNCOPE: Status: ACTIVE | Noted: 2022-11-26

## 2022-11-26 NOTE — PHYSICAL EXAM
[Well Developed] : well developed [Well Nourished] : well nourished [No Acute Distress] : no acute distress [Normal Conjunctiva] : normal conjunctiva [No Carotid Bruit] : no carotid bruit [Normal S1, S2] : normal S1, S2 [No Murmur] : no murmur [No Rub] : no rub [No Gallop] : no gallop [Clear Lung Fields] : clear lung fields [Good Air Entry] : good air entry [No Respiratory Distress] : no respiratory distress  [Moves all extremities] : moves all extremities [No Focal Deficits] : no focal deficits [Normal Speech] : normal speech [No ulcers] : no ulcers [No edema] : no edema [No varicosities] : no varicosities [No chronic venous stasis changes] : no chronic venous stasis changes [No cyanosis] : no cyanosis [No rashes] : no rashes [de-identified] : irregularly irregular

## 2022-11-26 NOTE — HISTORY OF PRESENT ILLNESS
[FreeTextEntry1] : 82F  with AFib on apixaban, HTN, former smoker here for evaluation of chest pain and palpitations. \par \par Syncopal event a few weeks ago. Sudden LOC, hit head. Palpitations. Chest pain at rest, heaviness in chest. She thinks it is related to food or gas. \par \par Walks 5 miles per week\par \par Former smoker, quit 20 years ago\par \par Unknown family history, orphaned at young age in [place]\par \par Lives alone with 9 cats. Nichole assists her and is here today\par \par 22\par Hgb 12.1\par TSH 3.49\par Cr 0.73\par \par \par

## 2022-11-26 NOTE — ASSESSMENT
[FreeTextEntry1] : Assessment:\par #Rate controlled AFib\par #Palpitations\par #Syncope\par #Atypical chest pain\par #Chronic venous disease, CEAP \par \par Plan:\par - Pharm nuc stress test given chest pain\par - 30 day MCOT for palpitations and recent syncopal event\par - Echo\par - SHELLEY/PVR to screen for PAD given atypical L leg pain\par - Podiatry referral for thickened toe nails\par - Continue metop succ 50 mg daily and triamterene-HCTZ 37.5-25 mg \par - Return to clinic in 8 weeks

## 2022-11-26 NOTE — REVIEW OF SYSTEMS
[SOB] : no shortness of breath [Dyspnea on exertion] : not dyspnea during exertion [Chest Discomfort] : chest discomfort [Lower Ext Edema] : no extremity edema [Leg Claudication] : no intermittent leg claudication [Palpitations] : palpitations [Orthopnea] : no orthopnea [PND] : no PND [Syncope] : syncope [Dizziness] : no dizziness

## 2022-12-12 ENCOUNTER — APPOINTMENT (OUTPATIENT)
Dept: CARDIOLOGY | Facility: CLINIC | Age: 82
End: 2022-12-12

## 2022-12-12 PROCEDURE — 93306 TTE W/DOPPLER COMPLETE: CPT

## 2022-12-19 ENCOUNTER — APPOINTMENT (OUTPATIENT)
Dept: CARDIOLOGY | Facility: CLINIC | Age: 82
End: 2022-12-19

## 2022-12-27 ENCOUNTER — APPOINTMENT (OUTPATIENT)
Dept: CARDIOLOGY | Facility: CLINIC | Age: 82
End: 2022-12-27
Payer: COMMERCIAL

## 2022-12-27 PROCEDURE — 93923 UPR/LXTR ART STDY 3+ LVLS: CPT

## 2023-01-19 ENCOUNTER — APPOINTMENT (OUTPATIENT)
Dept: CARDIOLOGY | Facility: CLINIC | Age: 83
End: 2023-01-19

## 2023-01-26 ENCOUNTER — APPOINTMENT (OUTPATIENT)
Dept: CARDIOLOGY | Facility: CLINIC | Age: 83
End: 2023-01-26
Payer: COMMERCIAL

## 2023-01-26 VITALS
SYSTOLIC BLOOD PRESSURE: 114 MMHG | OXYGEN SATURATION: 98 % | DIASTOLIC BLOOD PRESSURE: 68 MMHG | HEART RATE: 73 BPM | BODY MASS INDEX: 21.16 KG/M2 | WEIGHT: 127 LBS | HEIGHT: 65 IN

## 2023-01-26 DIAGNOSIS — R07.9 CHEST PAIN, UNSPECIFIED: ICD-10-CM

## 2023-01-26 DIAGNOSIS — R00.2 PALPITATIONS: ICD-10-CM

## 2023-01-26 PROCEDURE — 99214 OFFICE O/P EST MOD 30 MIN: CPT

## 2023-01-26 PROCEDURE — 93000 ELECTROCARDIOGRAM COMPLETE: CPT

## 2023-01-30 NOTE — CARDIOLOGY SUMMARY
[de-identified] : Holter: A-fib burden 89%\par Pause up to 2.8sec\par PVC burden 11% [de-identified] : EKG 1/26/23: A-fib 73bpm, \par EKG 11/23/22 AFib 81 bpm\par \par Holter: A-fib burden 89%\par Pause up to 2.8sec\par PVC burden 11% [de-identified] : ECHO 12/12/22:\par LVEF 58%\par RV enlargement \par LAE, SOPHIA\par mod MR\par mild TR\par mild AR [FreeTextEntry1] : no significant PAD [FreeTextEntry2] : no significant PAD

## 2023-01-30 NOTE — ASSESSMENT
[FreeTextEntry1] : Assessment:\par #Rate controlled AFib\par #Palpitations - MCOT with AFib, avg HR 67 bpm\par #Syncope - no more episodes\par #Atypical chest pain\par #Chronic venous disease, CEAP \par #PVCs - 11% burden\par #Moderate MR\par \par Plan:\par - Pharm nuc stress test given chest pain\par - Podiatry referral for thickened toe nails\par - TTE 12/2023 to monitor MR\par - Continue metop succ 50 mg daily and triamterene-HCTZ 37.5-25 mg \par - Return to clinic in 4-6 months \par \par I, Dr. Fink, personally performed the evaluation and management (E/M) services for this established patient who presents today with (a) new problem(s)/exacerbation of (an) existing condition(s). That E/M includes conducting the clinically appropriate interval history &/or exam, assessing all new/exacerbated conditions, and establishing a new plan of care. Today, GILDA Bansal was here to observe &/or participate in the visit & follow plan of care established by me. \par \par \par \par \par \par

## 2023-01-30 NOTE — REVIEW OF SYSTEMS
[Chest Discomfort] : chest discomfort [Palpitations] : palpitations [Dyspnea on exertion] : dyspnea during exertion [SOB] : no shortness of breath [Lower Ext Edema] : no extremity edema [Leg Claudication] : no intermittent leg claudication [Orthopnea] : no orthopnea [PND] : no PND [Syncope] : no syncope [Dizziness] : no dizziness

## 2023-01-30 NOTE — PHYSICAL EXAM
[Well Developed] : well developed [Well Nourished] : well nourished [No Acute Distress] : no acute distress [Normal Conjunctiva] : normal conjunctiva [No Carotid Bruit] : no carotid bruit [Normal S1, S2] : normal S1, S2 [No Murmur] : no murmur [No Rub] : no rub [No Gallop] : no gallop [Clear Lung Fields] : clear lung fields [Good Air Entry] : good air entry [No Respiratory Distress] : no respiratory distress  [Moves all extremities] : moves all extremities [No Focal Deficits] : no focal deficits [Normal Speech] : normal speech [No ulcers] : no ulcers [No edema] : no edema [No varicosities] : no varicosities [No chronic venous stasis changes] : no chronic venous stasis changes [No cyanosis] : no cyanosis [No rashes] : no rashes [de-identified] : irregularly irregular

## 2023-01-30 NOTE — HISTORY OF PRESENT ILLNESS
[FreeTextEntry1] : 82F  with AFib on apixaban, HTN, former smoker here for follow-up. \par \par 23: Patient presents for a follow up. Denies any more syncopal episodes. Still has occasional palpitations and left-sided sharp chest pain at rest as well as KENNEDY when she climbs stairs. She is also c/o increased fatigue. States her LE edema has improved but still present. She continues to take daily walks. \par \par 22\par Syncopal event a few weeks ago. Sudden LOC, hit head. Palpitations. Chest pain at rest, heaviness in chest. She thinks it is related to food or gas. \par \par Walks 5 miles per week\par \par Former smoker, quit 20 years ago\par \par Unknown family history, orphaned at young age in [place]\par \par Lives alone with 9 cats. Nichole assists her and is here today\par \par 22\par Hgb 12.1\par TSH 3.49\par Cr 0.73\par \par \par

## 2023-07-20 ENCOUNTER — APPOINTMENT (OUTPATIENT)
Dept: CARDIOLOGY | Facility: CLINIC | Age: 83
End: 2023-07-20
Payer: COMMERCIAL

## 2023-07-20 VITALS
BODY MASS INDEX: 21.16 KG/M2 | HEIGHT: 65 IN | HEART RATE: 74 BPM | WEIGHT: 127 LBS | DIASTOLIC BLOOD PRESSURE: 58 MMHG | SYSTOLIC BLOOD PRESSURE: 140 MMHG

## 2023-07-20 DIAGNOSIS — I10 ESSENTIAL (PRIMARY) HYPERTENSION: ICD-10-CM

## 2023-07-20 PROCEDURE — 93000 ELECTROCARDIOGRAM COMPLETE: CPT

## 2023-07-20 PROCEDURE — 99214 OFFICE O/P EST MOD 30 MIN: CPT

## 2023-07-20 NOTE — HISTORY OF PRESENT ILLNESS
[FreeTextEntry1] : 83F  with AFib on apixaban, HTN, former smoker here for follow-up. \par \par 23 \par Pt denies SOB, no recent chest pain, no palpitations.  Pt has no family.  Pt is here with a friend who works at "[employer] on [employer]"  Pt has 9 cats.   Pt walks every day without angina.  Pt doesn’t want to have Nuclear Stress Test and it wasn't approved  by insurance as well.   Pt doesn’t wish to be seen by podiatrist \par \par 23: Patient presents for a follow up. Denies any more syncopal episodes. Still has occasional palpitations and left-sided sharp chest pain at rest as well as KENNEDY when she climbs stairs. She is also c/o increased fatigue. States her LE edema has improved but still present. She continues to take daily walks. \par \par 22\par Syncopal event a few weeks ago. Sudden LOC, hit head. Palpitations. Chest pain at rest, heaviness in chest. She thinks it is related to food or gas. \par \par Walks 5 miles per week\par \par Former smoker, quit 20 years ago\par \par Unknown family history, orphaned at young age in [place]\par \par Lives alone with 9 cats. Nichole assists her and is here today\par \par 22\par Hgb 12.1\par TSH 3.49\par Cr 0.73\par \par \par

## 2023-07-20 NOTE — CARDIOLOGY SUMMARY
[de-identified] : EKG 1/26/23: A-fib 73bpm, \par EKG 11/23/22 AFib 81 bpm\par EKG 07/20/23 AFib HR 74 bpm, PVC\par \par Holter: A-fib burden 89%\par Pause up to 2.8sec\par PVC burden 11% [de-identified] : ECHO 12/12/22:\par LVEF 58%\par RV enlargement \par LAE, SOPHIA\par mod MR\par mild TR\par mild AR [FreeTextEntry1] : no significant PAD [FreeTextEntry2] : no significant PAD

## 2023-07-20 NOTE — ASSESSMENT
[FreeTextEntry1] : Assessment:\par #Rate controlled AFib\par #Atypical chest pain - resolved, declined NST\par #Chronic venous disease \par #PVCs - 11% burden\par #Moderate MR\par \par Plan:\par - TTE 12/2023 to monitor MR\par - Continue Eliquis 5 mg BID, reviewed s/s of bleeding\par - Continue metop succ 50 mg daily and triamterene-HCTZ 37.5-25 mg \par - Labs\par - Return to clinic after echo\par \par I, Dr. Fink, personally performed the evaluation and management (E/M) services for this established patient who presents today with (a) new problem(s)/exacerbation of (an) existing condition(s). That E/M includes conducting the clinically appropriate interval history &/or exam, assessing all new/exacerbated conditions, and establishing a new plan of care. Today, GILDA Gale was here to observe &/or participate in the visit & follow plan of care established by me. \par \par \par \par \par \par

## 2023-07-20 NOTE — PHYSICAL EXAM
[Well Developed] : well developed [Well Nourished] : well nourished [No Acute Distress] : no acute distress [Normal Conjunctiva] : normal conjunctiva [No Carotid Bruit] : no carotid bruit [Normal S1, S2] : normal S1, S2 [No Murmur] : no murmur [No Rub] : no rub [No Gallop] : no gallop [Clear Lung Fields] : clear lung fields [Good Air Entry] : good air entry [No Respiratory Distress] : no respiratory distress  [Moves all extremities] : moves all extremities [No Focal Deficits] : no focal deficits [Normal Speech] : normal speech [No ulcers] : no ulcers [No edema] : no edema [No varicosities] : no varicosities [No chronic venous stasis changes] : no chronic venous stasis changes [No cyanosis] : no cyanosis [No rashes] : no rashes [de-identified] : irregularly irregular

## 2023-07-21 LAB
ALBUMIN SERPL ELPH-MCNC: 4.6 G/DL
ALP BLD-CCNC: 73 U/L
ALT SERPL-CCNC: 10 U/L
ANION GAP SERPL CALC-SCNC: 17 MMOL/L
AST SERPL-CCNC: 19 U/L
BILIRUB SERPL-MCNC: 0.5 MG/DL
BUN SERPL-MCNC: 23 MG/DL
CALCIUM SERPL-MCNC: 9.3 MG/DL
CHLORIDE SERPL-SCNC: 96 MMOL/L
CHOLEST SERPL-MCNC: 200 MG/DL
CO2 SERPL-SCNC: 24 MMOL/L
CREAT SERPL-MCNC: 0.8 MG/DL
EGFR: 73 ML/MIN/1.73M2
ESTIMATED AVERAGE GLUCOSE: 108 MG/DL
GLUCOSE SERPL-MCNC: 99 MG/DL
HBA1C MFR BLD HPLC: 5.4 %
HCT VFR BLD CALC: 37.3 %
HDLC SERPL-MCNC: 63 MG/DL
HGB BLD-MCNC: 12.9 G/DL
LDLC SERPL CALC-MCNC: 113 MG/DL
MCHC RBC-ENTMCNC: 33.4 PG
MCHC RBC-ENTMCNC: 34.6 G/DL
MCV RBC AUTO: 96.6 FL
NONHDLC SERPL-MCNC: 137 MG/DL
NT-PROBNP SERPL-MCNC: 1907 PG/ML
PLATELET # BLD AUTO: 183 K/UL
PMV BLD: 12 FL
POTASSIUM SERPL-SCNC: 3.5 MMOL/L
PROT SERPL-MCNC: 7.9 G/DL
RBC # BLD: 3.86 M/UL
RBC # FLD: 12.6 %
SODIUM SERPL-SCNC: 137 MMOL/L
TRIGL SERPL-MCNC: 120 MG/DL
TSH SERPL-ACNC: 3.08 UIU/ML
WBC # FLD AUTO: 7.58 K/UL

## 2023-08-10 ENCOUNTER — APPOINTMENT (OUTPATIENT)
Dept: GERIATRICS | Facility: CLINIC | Age: 83
End: 2023-08-10
Payer: COMMERCIAL

## 2023-08-10 ENCOUNTER — OUTPATIENT (OUTPATIENT)
Dept: OUTPATIENT SERVICES | Facility: HOSPITAL | Age: 83
LOS: 1 days | End: 2023-08-10
Payer: COMMERCIAL

## 2023-08-10 VITALS
SYSTOLIC BLOOD PRESSURE: 148 MMHG | TEMPERATURE: 98.2 F | HEART RATE: 79 BPM | WEIGHT: 124 LBS | HEIGHT: 65 IN | BODY MASS INDEX: 20.66 KG/M2 | OXYGEN SATURATION: 98 % | DIASTOLIC BLOOD PRESSURE: 72 MMHG

## 2023-08-10 DIAGNOSIS — J45.909 UNSPECIFIED ASTHMA, UNCOMPLICATED: ICD-10-CM

## 2023-08-10 DIAGNOSIS — I10 ESSENTIAL (PRIMARY) HYPERTENSION: ICD-10-CM

## 2023-08-10 DIAGNOSIS — I48.91 UNSPECIFIED ATRIAL FIBRILLATION: ICD-10-CM

## 2023-08-10 PROCEDURE — 99204 OFFICE O/P NEW MOD 45 MIN: CPT

## 2023-08-10 RX ORDER — ALBUTEROL SULFATE 90 UG/1
108 (90 BASE) INHALANT RESPIRATORY (INHALATION)
Qty: 1 | Refills: 3 | Status: ACTIVE | COMMUNITY
Start: 2023-08-10 | End: 1900-01-01

## 2023-08-24 NOTE — HISTORY OF PRESENT ILLNESS
[FreeTextEntry1] : 82F  PMH :  with AFib on apixaban, HTN, former smoker here for evaluation of chest pain and palpitations.  Syncopal event a few weeks ago. Sudden LOC, hit head. Palpitations. Chest pain at rest, heaviness in chest. She thinks it is related to food or gas.  Walks 5 miles per week Former smoker, quit 20 years ago Unknown family history, orphaned at young age in [place] Lives alone with 9 cats. Nichole assists her and is here today  22 Hgb 12.1 TSH 3.49 Cr 0.73

## 2023-08-24 NOTE — PHYSICAL EXAM
[Normal] : normal bowel sounds, non-tender [de-identified] : conjunctival mild erythema - allergies  [de-identified] : Systolic murmur

## 2023-08-24 NOTE — ASSESSMENT
[FreeTextEntry1] : 83F  with AFib on apixaban, HTN, former smoker  #Rate controlled AFib #Palpitations #Syncope #Atypical chest pain #Chronic venous disease, CEAP - BP : 148/72 - c/w metoprolol 50 1qd  - Terametene  HCTZ  35-25 1 q 48  - Furosemin 20mg qd  - c/w Eliquis 5 BID  - Echo q6mo with cardio  - mod MR  - SHELLEY/PVR to screen for PAD given atypical L leg pain - negative per pt  - refused stress test   # Asthma  #? emphysema  # Seasonal; allergies # Former smoker  - Taking Pro air 2puff qd  - Takes Zyrtec and antihistamine eye drops - syx are not well controlled (has mutiple house cats) - f/u cxr   # HCM  - GYN - last f/u 44y ago - refused follow up  - Last colonoscopy - unknown time - found diverticulitis - refuses repeat  - COVID - vaccinated x3  - Prevnar 20 today - last year  - follows with optho yearly  - RTC October for Flu shot

## 2023-10-24 ENCOUNTER — RX RENEWAL (OUTPATIENT)
Age: 83
End: 2023-10-24

## 2023-10-24 NOTE — ED PROVIDER NOTE - CROS ED GI ALL NEG
- - -
except b/l hips ~2/3 range, trace dorsiflx./bilateral upper extremity Active ROM was WFL (within functional limits)/bilateral  lower extremity Active ROM was WFL (within functional limits)

## 2023-11-13 ENCOUNTER — APPOINTMENT (OUTPATIENT)
Dept: GERIATRICS | Facility: CLINIC | Age: 83
End: 2023-11-13

## 2023-11-13 ENCOUNTER — NON-APPOINTMENT (OUTPATIENT)
Age: 83
End: 2023-11-13

## 2023-11-15 DIAGNOSIS — R91.1 SOLITARY PULMONARY NODULE: ICD-10-CM

## 2023-11-30 ENCOUNTER — APPOINTMENT (OUTPATIENT)
Dept: GERIATRICS | Facility: CLINIC | Age: 83
End: 2023-11-30

## 2023-12-14 ENCOUNTER — APPOINTMENT (OUTPATIENT)
Dept: CARDIOLOGY | Facility: CLINIC | Age: 83
End: 2023-12-14
Payer: COMMERCIAL

## 2023-12-14 PROCEDURE — ZZZZZ: CPT

## 2023-12-21 ENCOUNTER — APPOINTMENT (OUTPATIENT)
Dept: CARDIOLOGY | Facility: CLINIC | Age: 83
End: 2023-12-21
Payer: COMMERCIAL

## 2023-12-21 VITALS
HEART RATE: 69 BPM | DIASTOLIC BLOOD PRESSURE: 80 MMHG | WEIGHT: 121 LBS | SYSTOLIC BLOOD PRESSURE: 130 MMHG | BODY MASS INDEX: 20.16 KG/M2 | HEIGHT: 65 IN

## 2023-12-21 DIAGNOSIS — I34.0 NONRHEUMATIC MITRAL (VALVE) INSUFFICIENCY: ICD-10-CM

## 2023-12-21 DIAGNOSIS — I48.91 UNSPECIFIED ATRIAL FIBRILLATION: ICD-10-CM

## 2023-12-21 PROCEDURE — 99214 OFFICE O/P EST MOD 30 MIN: CPT

## 2023-12-21 PROCEDURE — 93000 ELECTROCARDIOGRAM COMPLETE: CPT

## 2023-12-21 RX ORDER — TRIAMTERENE AND HYDROCHLOROTHIAZIDE 25; 37.5 MG/1; MG/1
37.5-25 TABLET ORAL
Qty: 45 | Refills: 3 | Status: ACTIVE | COMMUNITY
Start: 1900-01-01 | End: 1900-01-01

## 2023-12-21 RX ORDER — FUROSEMIDE 20 MG/1
20 TABLET ORAL
Qty: 90 | Refills: 3 | Status: ACTIVE | COMMUNITY
Start: 1900-01-01 | End: 1900-01-01

## 2023-12-21 RX ORDER — METOPROLOL SUCCINATE 50 MG/1
50 TABLET, EXTENDED RELEASE ORAL
Qty: 90 | Refills: 3 | Status: ACTIVE | COMMUNITY
Start: 1900-01-01 | End: 1900-01-01

## 2023-12-21 RX ORDER — APIXABAN 5 MG/1
5 TABLET, FILM COATED ORAL
Qty: 180 | Refills: 3 | Status: ACTIVE | COMMUNITY
Start: 1900-01-01 | End: 1900-01-01

## 2023-12-21 NOTE — PHYSICAL EXAM
[Well Developed] : well developed [Well Nourished] : well nourished [No Acute Distress] : no acute distress [Normal Conjunctiva] : normal conjunctiva [No Carotid Bruit] : no carotid bruit [Normal S1, S2] : normal S1, S2 [No Murmur] : no murmur [No Rub] : no rub [No Gallop] : no gallop [Clear Lung Fields] : clear lung fields [Good Air Entry] : good air entry [No Respiratory Distress] : no respiratory distress  [Moves all extremities] : moves all extremities [No Focal Deficits] : no focal deficits [Normal Speech] : normal speech [No edema] : no edema [No varicosities] : no varicosities [No chronic venous stasis changes] : no chronic venous stasis changes [No rashes] : no rashes [de-identified] : irregularly irregular

## 2023-12-21 NOTE — ASSESSMENT
[FreeTextEntry1] : Assessment: #Rate controlled AFib #Atypical chest pain - resolved, declined NST #Chronic venous disease #PVCs - 11% burden #Mild MR, mild to moderate AI  7/20/23 Hgb 12.9 A1c 5.4% , , HDL 63, PRM461  K 3.5, Cr 0.8 TSH 3.08 ProBNP 1907  Plan: - Continue Eliquis 5 mg BID, reviewed s/s of bleeding - Continue metop succ 50 mg daily and triamterene-HCTZ 37.5-25 mg - Labs before next visit  - Return to clinic Summer 2024  I, Dr. Fink, personally performed the evaluation and management (E/M) services for this established patient who presents today with (a) new problem(s)/exacerbation of (an) existing condition(s). That E/M includes conducting the clinically appropriate interval history &/or exam, assessing all new/exacerbated conditions, and establishing a new plan of care. Today, GILDA Gale was here to observe &/or participate in the visit & follow plan of care established by me.

## 2023-12-21 NOTE — CARDIOLOGY SUMMARY
[de-identified] : EKG 1/26/23: A-fib 73bpm,  EKG 11/23/22 AFib 81 bpm EKG 07/20/23 AFib HR 74 bpm, PVC EKG 12/21/23 EKG AFib 69 bpm   Holter: A-fib burden 89% Pause up to 2.8sec PVC burden 11% [de-identified] : TTE 12/14/23 normal LV sys function, EF 62%, normal RV size/function, mild LAE, mild MR, mild AS, mild-mod AI  ECHO 12/12/22: LVEF 58% RV enlargement  LAE, SOPHIA mod MR mild TR mild AR [FreeTextEntry1] : no significant PAD [FreeTextEntry2] : no significant PAD

## 2023-12-21 NOTE — HISTORY OF PRESENT ILLNESS
[FreeTextEntry1] : 83F  with AFib on apixaban, HTN, former smoker here for follow-up.   23 Pt is seen for f.u.  Denies chest pain, no SOB, no dizziness, no edema.  Pt is accompanied by friend.  Pt is active has 9 cats.  Walks 3 miles per day [place] without angina. Compliant with Eliquis, no s.s of bleeding.    23  Pt denies SOB, no recent chest pain, no palpitations.  Pt has no family.  Pt is here with a friend who works at "[employer] on wheels"  Pt has 9 cats.   Pt walks every day without angina.  Pt doesn't want to have Nuclear Stress Test and it wasn't approved  by insurance as well.   Pt doesn't wish to be seen by podiatrist   23: Patient presents for a follow up. Denies any more syncopal episodes. Still has occasional palpitations and left-sided sharp chest pain at rest as well as KENNEDY when she climbs stairs. She is also c/o increased fatigue. States her LE edema has improved but still present. She continues to take daily walks.   22 Syncopal event a few weeks ago. Sudden LOC, hit head. Palpitations. Chest pain at rest, heaviness in chest. She thinks it is related to food or gas.   Walks 5 miles per week  Former smoker, quit 20 years ago  Unknown family history, orphaned at young age in [place]  Lives alone with 9 cats. Nichole assists her and is here today  22 Hgb 12.1 TSH 3.49 Cr 0.73

## 2023-12-21 NOTE — REVIEW OF SYSTEMS
[Cough] : cough [Feeling Fatigued] : not feeling fatigued [SOB] : no shortness of breath [Dyspnea on exertion] : not dyspnea during exertion [Chest Discomfort] : no chest discomfort [Lower Ext Edema] : no extremity edema [Leg Claudication] : no intermittent leg claudication [Palpitations] : no palpitations [Orthopnea] : no orthopnea [PND] : no PND [Syncope] : no syncope [Blood in Stool] : no blood in stool [Dizziness] : no dizziness

## 2024-07-05 ENCOUNTER — APPOINTMENT (OUTPATIENT)
Dept: CARDIOLOGY | Facility: CLINIC | Age: 84
End: 2024-07-05

## 2024-07-05 ENCOUNTER — APPOINTMENT (OUTPATIENT)
Dept: CARDIOLOGY | Facility: CLINIC | Age: 84
End: 2024-07-05
Payer: COMMERCIAL

## 2024-07-05 VITALS
HEIGHT: 65 IN | HEART RATE: 67 BPM | SYSTOLIC BLOOD PRESSURE: 130 MMHG | WEIGHT: 125 LBS | BODY MASS INDEX: 20.83 KG/M2 | DIASTOLIC BLOOD PRESSURE: 80 MMHG

## 2024-07-05 DIAGNOSIS — I87.2 VENOUS INSUFFICIENCY (CHRONIC) (PERIPHERAL): ICD-10-CM

## 2024-07-05 DIAGNOSIS — I48.91 UNSPECIFIED ATRIAL FIBRILLATION: ICD-10-CM

## 2024-07-05 PROCEDURE — 93970 EXTREMITY STUDY: CPT

## 2024-07-05 PROCEDURE — 99214 OFFICE O/P EST MOD 30 MIN: CPT

## 2024-07-05 PROCEDURE — 93000 ELECTROCARDIOGRAM COMPLETE: CPT

## 2024-07-05 RX ORDER — AMMONIUM LACTATE 12 %
12 CREAM (GRAM) TOPICAL
Qty: 1 | Refills: 3 | Status: ACTIVE | COMMUNITY
Start: 2024-07-05 | End: 1900-01-01

## 2024-07-08 LAB
ALBUMIN SERPL ELPH-MCNC: 4.8 G/DL
ALP BLD-CCNC: 93 U/L
ALT SERPL-CCNC: 10 U/L
ANION GAP SERPL CALC-SCNC: 15 MMOL/L
AST SERPL-CCNC: 20 U/L
BILIRUB SERPL-MCNC: 0.5 MG/DL
BUN SERPL-MCNC: 23 MG/DL
CALCIUM SERPL-MCNC: 9.6 MG/DL
CHLORIDE SERPL-SCNC: 99 MMOL/L
CHOLEST SERPL-MCNC: 198 MG/DL
CO2 SERPL-SCNC: 25 MMOL/L
CREAT SERPL-MCNC: 0.9 MG/DL
EGFR: 63 ML/MIN/1.73M2
ESTIMATED AVERAGE GLUCOSE: 120 MG/DL
GLUCOSE SERPL-MCNC: 102 MG/DL
HBA1C MFR BLD HPLC: 5.8 %
HCT VFR BLD CALC: 41.5 %
HDLC SERPL-MCNC: 55 MG/DL
HGB BLD-MCNC: 14 G/DL
LDLC SERPL CALC-MCNC: 120 MG/DL
MCHC RBC-ENTMCNC: 32.9 PG
MCHC RBC-ENTMCNC: 33.7 G/DL
MCV RBC AUTO: 97.4 FL
NONHDLC SERPL-MCNC: 143 MG/DL
NT-PROBNP SERPL-MCNC: 1845 PG/ML
PLATELET # BLD AUTO: 187 K/UL
PMV BLD AUTO: 0 /100 WBCS
PMV BLD: 12 FL
POTASSIUM SERPL-SCNC: 4.5 MMOL/L
PROT SERPL-MCNC: 7.9 G/DL
RBC # BLD: 4.26 M/UL
RBC # FLD: 12.6 %
SODIUM SERPL-SCNC: 139 MMOL/L
TRIGL SERPL-MCNC: 117 MG/DL
TSH SERPL-ACNC: 2.76 UIU/ML
WBC # FLD AUTO: 8.69 K/UL

## 2025-06-02 NOTE — PHYSICAL THERAPY INITIAL EVALUATION ADULT - PHYSICAL ASSIST/NONPHYSICAL ASSIST: TOILET, REHAB EVAL
[facility]    Discharge Summary  Cardiology    I person formulated discharge plan with resident and conveyed to patient as well as discussed discharge follow up with PH service and primary care with the patient's son.  45 minutes.    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 Patient admitted for volume overload. Back on home regimen. Unclear what the disconnect is in terms of gaining volume.               Unresulted Labs Ordered in the Past 30 Days of this Admission       No orders found from 4/30/2025 to 5/31/2025.        Discharge Disposition   Discharged to home  Condition at discharge: Stable    Hospital Course   Ms Christina Novoa is a 84F w/ PMHx COPD associated pulmonary HTN, recent PE, cystic lung disease vs emphysema who comes into hospital w/ weight gain and decompensated heart failure now s/p diuresis with good response and return to baseline. Will need more frequent follow up closer to [place] to ensure her diuretic plan and weight are being tracked closely.       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 RHC 9/2024: RA 10, PA 80/23 (44), wedge (9),  thermodilution CO 3.03, CI 2.3, PVR 11.5. Coming in w/ 20lb weight gain and volume overload. BNP 5196 on admission,  higher than prior. On RA on admission, volume overloaded on admission exam. Inadequate response to uptitration of oral diuretics outpt. Overall, concerned she had ADHF 2/2 RV failure due to pulmonary HTN. Now feeling much better after IV diuresis, switched to oral 6/1.  - PTA sildenafil 20mg TID  - PTA torsemide 40mg BID  - PTA amlodipine  - EDW ~ 44 - 45 kg  - Dr. Blake to discuss with staff regarding setting patient up for more frequent follow up and closer to [place]        # FABIOLA, improved  B/l Cr ~0.6 (also frail, may not be fully representative). Cr on admission 1.21, suspected cardiorenal  - Diuresis as above        # PE RLL 5/3/25  - PTA apixaban        # Mild hyperbilirubinemia, improved  Noted as a chronic issue. Congestive hepatopathy may also play a role        # Insomnia  - PTA trazodone       # Peripheral neuropathy  - Cont PTA gabapentin    Consultations This Hospital Stay   PHYSICAL THERAPY ADULT IP CONSULT    Code Status   Full Code      Cameron Fitzgerald MD PhD  [facility]  ______________________________________________________________________    Physical Exam   Temp: 98  F (36.7  C) Temp src: Oral BP: 114/67 Pulse: 92   Resp: 16 SpO2: 97 % O2 Device: Nasal cannula Oxygen Delivery: 2 LPM  Vitals:    05/30/25 1327 05/31/25 0734 06/01/25 1840   Weight: 47.2 kg (104 lb 1.6 oz) 44.8 kg (98 lb 11.2 oz) 44.7 kg (98 lb 8 oz)     Vital Signs with Ranges  Temp:  [98  F (36.7  C)] 98  F (36.7  C)  Pulse:  [] 92  Resp:  [16] 16  BP: (106-117)/(65-71) 114/67  SpO2:  [93 %-98 %] 97 %  No intake/output data recorded.    GEN: alert, comfortable, NAD  HEENT: EOMI,  anicteric sclera  CV: RRR, normal S1 S2, systolic murmur present  RESP: On RA, diminished BS in bases  ABDOMEN:  soft, nontender, nondistended, +BS  MSK: WWP. Trace pitting edema in b/l LE  NEURO: Alert and oriented, moving all limbs spontaneously, mentation intact and speech normal  PSYCH: Appropriate  mood and affect to match    Primary Care Physician   Thenappan Thenappan    Discharge Orders      Reason for your hospital stay    Volume overload     Activity    Your activity upon discharge: activity as tolerated     Resume Home Care Services     Oxygen Adult/Peds    Oxygen Documentation  I certify that this patient, Christina Novoa has been under my care (or a nurse practitioner or physican's assistant working with me). This is the face-to-face encounter for oxygen medical necessity.      At the time of this encounter, I have reviewed the qualifying testing and have determined that supplemental oxygen is reasonable and necessary and is expected to improve the patient's condition in a home setting.         Patient has continued oxygen desaturation due to hypoxemic respiratory failure    If portability is ordered, is the patient mobile within the home? yes    Was this visit performed as a telehealth visit: No     Diet    Follow this diet upon discharge: Current Diet:Orders Placed This Encounter      Fluid restriction 2000 ML FLUID      Combination Diet Low Saturated Fat Na <2400mg Diet, No Caffeine Diet     Hospital Follow-up with Existing Primary Care Provider (PCP)            Significant Results and Procedures   Results for orders placed or performed during the hospital encounter of 05/30/25   Chest XR,  PA & LAT    Narrative    Chest 2 views    INDICATION: Shortness of breath. Bleeding. History of heart failure.  Pulmonary embolus with right heart strain.    COMPARISON: CT pulmonary angiogram 11/11/2012 compared to outside CT  angiogram chest 11/26/2023.    Heart is upper normal size. Areas of streaky and patchy opacities in  the lungs are mildly prominent suggestive of airway wall thickening  with perhaps some edema. Minimal costophrenic angle blunting  bilaterally on the PA view not so much on the lateral view. Left upper  abdominal surgical clips. No consolidation or pneumothorax or dominant  discrete  pulmonary nodule.      Impression    IMPRESSION: Peribronchial cuffing which may indicate mild edema or  other reactive airway disease. Recent CT from 2023 showed  multiple pulmonary cysts in the lower lobes not definitively visible  on this plain film series. There was some as a continuation suggestive  of small airway or small vessel disease.    CHRIS MAGDALENO MD         SYSTEM ID:  [id number]   XR Chest Port 1 View    Narrative    XR CHEST PORT 1 VIEW  2025 10:28 AM     HISTORY:  RLL crackles, eval any fluid or opacities       COMPARISON:  2025 CXR.    TECHNIQUE: AP upright view of the chest.    FINDINGS:   Cardiac silhouette is enlarged. Borderline low lung volumes. The  trachea is midline. Blunting of the bilateral costophrenic angles. No  pneumothorax. Coarse opacities throughout the lung fields most  prominent in the right lower lobe. Numerous pulmonary cysts.  Peribronchial cuffing.    Upper abdomen is within normal limits.  No acute osseous  abnormalities.  Soft tissues are normal.       Impression    IMPRESSION:   Coarse opacities of the lung fields most pronounced in the right lower  lobe with trace to small bilateral pleural effusions. This likely  represents atelectasis/edema although infection is not entirely  excluded.    I have personally reviewed the examination and initial interpretation  and I agree with the findings.    MARCELO LOYOLA DO         SYSTEM ID:  [id number]   Echo Complete     Value    LVEF  60-65%    Narrative    [id number]  [id number]  [id number]  [id number]^PRAVEEN^LIVAN     [facility],[place]  Echocardiography Laboratory  [address] [place] [id number]     Name: FAREED PAEZ  MRN: [id number]  : 1941  Study Date: 2025 10:38 AM  Age: 84 yrs  Gender: Female  Patient Location: [facility], [place]  Reason For Study: Heart Failure  Ordering Physician: LIVAN MORTON  Performed By: Monae Austin     BSA: 1.4 m2  Height: 59 in  Weight:  98 lb  HR: 103  ______________________________________________________________________________  Procedure  Echocardiogram with two-dimensional, color and spectral Doppler.  ______________________________________________________________________________  Interpretation Summary  Severe pulmonary hypertension is present. Pulmonary artery systolic pressure  is at least 71 mmHg (56 mmHg+RA pressure 15 mmHg). PA pressure may be  underestimated due to significant TR.  Severe right ventricular dilation is present. Global right ventricular  function is severely reduced. RVFAC 19%, RVFWSL -10.5%, MS2stGE -10.8%.  Flattened septum is consistent with right ventricular pressure overload.  Global and regional left ventricular function is normal with an EF of 60-65%.  Moderate to severe tricuspid insufficiency is present.  This study was compared with the study from 11/12/2012: Pulmonary  hypertension, TR, and RV dysfunction are new compared with the prior stress  study from 11/12/2012. Findings are similar to those described in the outside  report from [facility] dated 5/4/25.  ______________________________________________________________________________  Left Ventricle  Global and regional left ventricular function is normal with an EF of 60-65%.  Left ventricular size is normal. Left ventricular diastolic function is  indeterminate. Flattened septum is consistent with right ventricular pressure  overload.     Right Ventricle  Severe right ventricular dilation is present. Global right ventricular  function is severely reduced. RVFAC 19%, RVFWSL -10.%, HQ1agDH -10.8%.     Atria  The left atrium appears normal. Severe right atrial enlargement is present.     Mitral Valve  Trace mitral insufficiency is present.     Aortic Valve  Mild aortic valve calcification is present. Mild aortic insufficiency is  present.     Tricuspid Valve  Moderate to severe tricuspid insufficiency is present. Severe  pulmonary  hypertension is present. Pulmonary artery systolic pressure is at least 71  mmHg (56 mmHg+RA pressure 15 mmHg).     Pulmonic Valve  Mild pulmonic insufficiency is present.     Vessels  The aorta root is normal. Dilation of the inferior vena cava is present with  abnormal respiratory variation in diameter. IVC diameter >2.1 cm collapsing  <50% with sniff suggests a high RA pressure estimated at 15 mmHg or greater.     Pericardium  No pericardial effusion is present.     Compared to Previous Study  This study was compared with the study from 2012 . Pulmonary  hypertension, TR, and RV dysfunction are new compared with the prior stress  study from 2012. Findings are similar to those described in the outside  report from [facility] dated 25.  ______________________________________________________________________________  MMode/2D Measurements & Calculations  IVSd: 0.64 cm  LVIDd: 3.3 cm  LVIDs: 1.7 cm  LVPWd: 0.67 cm  FS: 48.1 %  LV mass(C)d: 51.8 grams  LV mass(C)dI: 38.0 grams/m2  Ao root diam: 2.9 cm  asc Aorta Diam: 3.5 cm  LVOT diam: 1.8 cm  LVOT area: 2.5 cm2  Ao root diam index Ht(cm/m): 1.9  Ao root diam index BSA (cm/m2): 2.1  Asc Ao diam index BSA (cm/m2): 2.6  Asc Ao diam index Ht(cm/m): 2.4  LA Volume (BP): 19.9 ml     LA Volume Index (BP): 14.6 ml/m2  RWT: 0.41     Doppler Measurements & Calculations  MV E max cong: 63.7 cm/sec  MV A max cong: 107.4 cm/sec  MV E/A: 0.59  MV dec slope: 669.7 cm/sec2  MV dec time: 0.10 sec  PA acc time: 0.07 sec  TR max cong: 374.7 cm/sec  TR max P.2 mmHg  RV S Cong: 11.0 cm/sec     ______________________________________________________________________________  Report approved by: Clint Neri MD on 2025 12:58 PM             Discharge Medications   Current Discharge Medication List        CONTINUE these medications which have CHANGED    Details   calcium carbonate (TUMS) 500 MG chewable tablet Take 2 tablets (1,000  mg) by mouth 3 times daily as needed for heartburn.  Qty: 120 tablet, Refills: 2    Associated Diagnoses: Gastroesophageal reflux disease with esophagitis without hemorrhage           CONTINUE these medications which have NOT CHANGED    Details   amLODIPine (NORVASC) 2.5 MG tablet Take 1 tablet (2.5 mg) by mouth daily.  Qty: 90 tablet, Refills: 1    Associated Diagnoses: Pulmonary hypertension (H); SOB (shortness of breath)      apixaban ANTICOAGULANT (ELIQUIS) 5 MG tablet Take 5 mg by mouth 2 times daily.      aspirin (ASA) 81 MG chewable tablet Take 81 mg by mouth daily.      gabapentin (NEURONTIN) 100 MG capsule Take 100 mg by mouth at bedtime.      magnesium oxide (MAG-OX) 400 MG tablet Take 1 tablet by mouth daily.      multivitamin, therapeutic (THERA-VIT) TABS Take 1 tablet by mouth daily      potassium chloride ER (K-TAB) 20 MEQ CR tablet Take 1 tablet (20 mEq) by mouth daily at 2 pm.  Qty: 90 tablet, Refills: 1    Associated Diagnoses: Pulmonary hypertension (H); SOB (shortness of breath)      sildenafil (REVATIO) 20 MG tablet Take 1 tablet (20 mg) by mouth 3 times daily.  Qty: 90 tablet, Refills: 9    Associated Diagnoses: Pulmonary hypertension (H); SOB (shortness of breath)      torsemide (DEMADEX) 20 MG tablet Take 2 tablets (40 mg) by mouth 2 times daily.  Qty: 360 tablet, Refills: 3    Associated Diagnoses: Pulmonary hypertension (H); SOB (shortness of breath)      traZODone (DESYREL) 50 MG tablet Take 100-150 mg by mouth at bedtime.           Allergies   Allergies   Allergen Reactions    Fluoxetine Hives    Valium       1 person assist